# Patient Record
Sex: FEMALE | Race: WHITE | Employment: UNEMPLOYED | URBAN - METROPOLITAN AREA
[De-identification: names, ages, dates, MRNs, and addresses within clinical notes are randomized per-mention and may not be internally consistent; named-entity substitution may affect disease eponyms.]

---

## 2024-01-21 ENCOUNTER — HOSPITAL ENCOUNTER (INPATIENT)
Age: 84
LOS: 4 days | Discharge: NURSING, REHAB OR LONG-TERM CARE | End: 2024-01-26
Attending: STUDENT IN AN ORGANIZED HEALTH CARE EDUCATION/TRAINING PROGRAM | Admitting: INTERNAL MEDICINE
Payer: MEDICARE

## 2024-01-21 ENCOUNTER — APPOINTMENT (OUTPATIENT)
Dept: GENERAL RADIOLOGY | Age: 84
End: 2024-01-21
Payer: MEDICARE

## 2024-01-21 DIAGNOSIS — U07.1 COVID-19: ICD-10-CM

## 2024-01-21 DIAGNOSIS — J81.0 ACUTE PULMONARY EDEMA (HCC): ICD-10-CM

## 2024-01-21 DIAGNOSIS — J96.01 ACUTE RESPIRATORY FAILURE WITH HYPOXIA (HCC): Primary | ICD-10-CM

## 2024-01-21 DIAGNOSIS — J18.9 PNEUMONIA OF BOTH LUNGS DUE TO INFECTIOUS ORGANISM, UNSPECIFIED PART OF LUNG: ICD-10-CM

## 2024-01-21 LAB
ALBUMIN SERPL-MCNC: 2.7 G/DL (ref 3.4–5)
ALBUMIN/GLOB SERPL: 0.7 {RATIO} (ref 1.1–2.2)
ALP SERPL-CCNC: 92 U/L (ref 40–129)
ALT SERPL-CCNC: 17 U/L (ref 10–40)
ANION GAP SERPL CALCULATED.3IONS-SCNC: 8 MMOL/L (ref 3–16)
AST SERPL-CCNC: 28 U/L (ref 15–37)
BASE EXCESS BLDV CALC-SCNC: -5.1 MMOL/L (ref -3–3)
BASOPHILS # BLD: 0.1 K/UL (ref 0–0.2)
BASOPHILS NFR BLD: 0.7 %
BILIRUB SERPL-MCNC: 0.5 MG/DL (ref 0–1)
BUN SERPL-MCNC: 28 MG/DL (ref 7–20)
CALCIUM SERPL-MCNC: 8.9 MG/DL (ref 8.3–10.6)
CHLORIDE SERPL-SCNC: 107 MMOL/L (ref 99–110)
CO2 BLDV-SCNC: 42 MMOL/L
CO2 SERPL-SCNC: 17 MMOL/L (ref 21–32)
COHGB MFR BLDV: 5.6 % (ref 0–1.5)
CREAT SERPL-MCNC: 0.9 MG/DL (ref 0.6–1.2)
DEPRECATED RDW RBC AUTO: 16.4 % (ref 12.4–15.4)
EOSINOPHIL # BLD: 0 K/UL (ref 0–0.6)
EOSINOPHIL NFR BLD: 0.2 %
FLUAV RNA RESP QL NAA+PROBE: NOT DETECTED
FLUBV RNA RESP QL NAA+PROBE: NOT DETECTED
GFR SERPLBLD CREATININE-BSD FMLA CKD-EPI: >60 ML/MIN/{1.73_M2}
GLUCOSE SERPL-MCNC: 123 MG/DL (ref 70–99)
HCO3 BLDV-SCNC: 18.1 MMOL/L (ref 23–29)
HCT VFR BLD AUTO: 34.2 % (ref 36–48)
HGB BLD-MCNC: 11.3 G/DL (ref 12–16)
LACTATE BLDV-SCNC: 1.1 MMOL/L (ref 0.4–1.9)
LYMPHOCYTES # BLD: 0.7 K/UL (ref 1–5.1)
LYMPHOCYTES NFR BLD: 8.5 %
MCH RBC QN AUTO: 30.1 PG (ref 26–34)
MCHC RBC AUTO-ENTMCNC: 32.9 G/DL (ref 31–36)
MCV RBC AUTO: 91.4 FL (ref 80–100)
METHGB MFR BLDV: 1.2 %
MONOCYTES # BLD: 1.2 K/UL (ref 0–1.3)
MONOCYTES NFR BLD: 15.5 %
NEUTROPHILS # BLD: 5.8 K/UL (ref 1.7–7.7)
NEUTROPHILS NFR BLD: 75.1 %
NT-PROBNP SERPL-MCNC: 9209 PG/ML (ref 0–449)
O2 CT VFR BLDV CALC: 15 VOL %
O2 THERAPY: ABNORMAL
PCO2 BLDV: 27.4 MMHG (ref 40–50)
PH BLDV: 7.43 [PH] (ref 7.35–7.45)
PLATELET # BLD AUTO: 291 K/UL (ref 135–450)
PMV BLD AUTO: 8.1 FL (ref 5–10.5)
PO2 BLDV: 133 MMHG (ref 25–40)
POTASSIUM SERPL-SCNC: 5.4 MMOL/L (ref 3.5–5.1)
PROCALCITONIN SERPL IA-MCNC: 0.1 NG/ML (ref 0–0.15)
PROT SERPL-MCNC: 6.5 G/DL (ref 6.4–8.2)
RBC # BLD AUTO: 3.74 M/UL (ref 4–5.2)
RSV AG NOSE QL: NEGATIVE
SAO2 % BLDV: 100 %
SARS-COV-2 RNA RESP QL NAA+PROBE: DETECTED
SODIUM SERPL-SCNC: 132 MMOL/L (ref 136–145)
TROPONIN, HIGH SENSITIVITY: 57 NG/L (ref 0–14)
WBC # BLD AUTO: 7.7 K/UL (ref 4–11)

## 2024-01-21 PROCEDURE — 80053 COMPREHEN METABOLIC PANEL: CPT

## 2024-01-21 PROCEDURE — 84145 PROCALCITONIN (PCT): CPT

## 2024-01-21 PROCEDURE — 83605 ASSAY OF LACTIC ACID: CPT

## 2024-01-21 PROCEDURE — 99285 EMERGENCY DEPT VISIT HI MDM: CPT

## 2024-01-21 PROCEDURE — 71045 X-RAY EXAM CHEST 1 VIEW: CPT

## 2024-01-21 PROCEDURE — 83880 ASSAY OF NATRIURETIC PEPTIDE: CPT

## 2024-01-21 PROCEDURE — 84484 ASSAY OF TROPONIN QUANT: CPT

## 2024-01-21 PROCEDURE — 87040 BLOOD CULTURE FOR BACTERIA: CPT

## 2024-01-21 PROCEDURE — 87636 SARSCOV2 & INF A&B AMP PRB: CPT

## 2024-01-21 PROCEDURE — 82803 BLOOD GASES ANY COMBINATION: CPT

## 2024-01-21 PROCEDURE — 93005 ELECTROCARDIOGRAM TRACING: CPT | Performed by: STUDENT IN AN ORGANIZED HEALTH CARE EDUCATION/TRAINING PROGRAM

## 2024-01-21 PROCEDURE — 87807 RSV ASSAY W/OPTIC: CPT

## 2024-01-21 PROCEDURE — 85025 COMPLETE CBC W/AUTO DIFF WBC: CPT

## 2024-01-21 RX ORDER — GUAIFENESIN 600 MG/1
1200 TABLET, EXTENDED RELEASE ORAL 2 TIMES DAILY
COMMUNITY

## 2024-01-21 RX ORDER — LOPERAMIDE HYDROCHLORIDE 2 MG/1
2 CAPSULE ORAL 4 TIMES DAILY PRN
Status: ON HOLD | COMMUNITY
End: 2024-01-26 | Stop reason: HOSPADM

## 2024-01-21 RX ORDER — NITROFURANTOIN MACROCRYSTALS 100 MG/1
100 CAPSULE ORAL 4 TIMES DAILY
Status: ON HOLD | COMMUNITY
End: 2024-01-26 | Stop reason: HOSPADM

## 2024-01-21 RX ORDER — ATORVASTATIN CALCIUM 40 MG/1
40 TABLET, FILM COATED ORAL DAILY
COMMUNITY

## 2024-01-21 RX ORDER — CITALOPRAM 40 MG/1
40 TABLET ORAL DAILY
COMMUNITY

## 2024-01-21 RX ORDER — POTASSIUM CHLORIDE 1.5 G/1.58G
20 POWDER, FOR SOLUTION ORAL 2 TIMES DAILY
Status: ON HOLD | COMMUNITY
End: 2024-01-26 | Stop reason: HOSPADM

## 2024-01-21 RX ORDER — OMEPRAZOLE 20 MG/1
20 CAPSULE, DELAYED RELEASE ORAL DAILY
COMMUNITY

## 2024-01-21 RX ORDER — ALBUTEROL SULFATE 0.63 MG/3ML
1 SOLUTION RESPIRATORY (INHALATION) EVERY 6 HOURS PRN
COMMUNITY

## 2024-01-21 RX ORDER — CLOPIDOGREL BISULFATE 75 MG/1
75 TABLET ORAL DAILY
COMMUNITY

## 2024-01-21 RX ORDER — ASPIRIN 81 MG/1
81 TABLET, CHEWABLE ORAL DAILY
COMMUNITY

## 2024-01-21 RX ORDER — CARVEDILOL 12.5 MG/1
12.5 TABLET ORAL 2 TIMES DAILY WITH MEALS
COMMUNITY

## 2024-01-21 RX ORDER — ALPRAZOLAM 0.25 MG/1
0.25 TABLET ORAL NIGHTLY PRN
COMMUNITY

## 2024-01-21 RX ORDER — ACETAMINOPHEN 500 MG
500 TABLET ORAL EVERY 6 HOURS PRN
Status: ON HOLD | COMMUNITY
End: 2024-01-26 | Stop reason: HOSPADM

## 2024-01-21 ASSESSMENT — PAIN - FUNCTIONAL ASSESSMENT: PAIN_FUNCTIONAL_ASSESSMENT: NONE - DENIES PAIN

## 2024-01-21 ASSESSMENT — LIFESTYLE VARIABLES: HOW OFTEN DO YOU HAVE A DRINK CONTAINING ALCOHOL: NEVER

## 2024-01-22 PROBLEM — J18.9 COMMUNITY ACQUIRED PNEUMONIA, BILATERAL: Status: ACTIVE | Noted: 2024-01-22

## 2024-01-22 LAB
ANION GAP SERPL CALCULATED.3IONS-SCNC: 11 MMOL/L (ref 3–16)
BUN SERPL-MCNC: 33 MG/DL (ref 7–20)
CALCIUM SERPL-MCNC: 9 MG/DL (ref 8.3–10.6)
CHLORIDE SERPL-SCNC: 109 MMOL/L (ref 99–110)
CO2 SERPL-SCNC: 19 MMOL/L (ref 21–32)
CREAT SERPL-MCNC: 1 MG/DL (ref 0.6–1.2)
CRP SERPL-MCNC: 193.7 MG/L (ref 0–5.1)
EKG ATRIAL RATE: 83 BPM
EKG DIAGNOSIS: NORMAL
EKG Q-T INTERVAL: 326 MS
EKG QRS DURATION: 84 MS
EKG QTC CALCULATION (BAZETT): 394 MS
EKG R AXIS: 61 DEGREES
EKG T AXIS: 185 DEGREES
EKG VENTRICULAR RATE: 88 BPM
FERRITIN SERPL IA-MCNC: 752.5 NG/ML (ref 15–150)
FOLATE SERPL-MCNC: <2 NG/ML (ref 4.78–24.2)
GFR SERPLBLD CREATININE-BSD FMLA CKD-EPI: 56 ML/MIN/{1.73_M2}
GLUCOSE SERPL-MCNC: 119 MG/DL (ref 70–99)
IRON SATN MFR SERPL: 11 % (ref 15–50)
IRON SERPL-MCNC: 15 UG/DL (ref 37–145)
POTASSIUM SERPL-SCNC: 4.3 MMOL/L (ref 3.5–5.1)
SODIUM SERPL-SCNC: 139 MMOL/L (ref 136–145)
TIBC SERPL-MCNC: 134 UG/DL (ref 260–445)
TROPONIN, HIGH SENSITIVITY: 55 NG/L (ref 0–14)
VIT B12 SERPL-MCNC: 563 PG/ML (ref 211–911)

## 2024-01-22 PROCEDURE — 6370000000 HC RX 637 (ALT 250 FOR IP): Performed by: INTERNAL MEDICINE

## 2024-01-22 PROCEDURE — 6370000000 HC RX 637 (ALT 250 FOR IP): Performed by: NURSE PRACTITIONER

## 2024-01-22 PROCEDURE — 97166 OT EVAL MOD COMPLEX 45 MIN: CPT

## 2024-01-22 PROCEDURE — 82607 VITAMIN B-12: CPT

## 2024-01-22 PROCEDURE — 6360000002 HC RX W HCPCS: Performed by: NURSE PRACTITIONER

## 2024-01-22 PROCEDURE — 2580000003 HC RX 258: Performed by: NURSE PRACTITIONER

## 2024-01-22 PROCEDURE — 82728 ASSAY OF FERRITIN: CPT

## 2024-01-22 PROCEDURE — 94640 AIRWAY INHALATION TREATMENT: CPT

## 2024-01-22 PROCEDURE — 82746 ASSAY OF FOLIC ACID SERUM: CPT

## 2024-01-22 PROCEDURE — 97530 THERAPEUTIC ACTIVITIES: CPT

## 2024-01-22 PROCEDURE — 83540 ASSAY OF IRON: CPT

## 2024-01-22 PROCEDURE — 97535 SELF CARE MNGMENT TRAINING: CPT

## 2024-01-22 PROCEDURE — C8929 TTE W OR WO FOL WCON,DOPPLER: HCPCS

## 2024-01-22 PROCEDURE — 6360000004 HC RX CONTRAST MEDICATION: Performed by: INTERNAL MEDICINE

## 2024-01-22 PROCEDURE — 86140 C-REACTIVE PROTEIN: CPT

## 2024-01-22 PROCEDURE — 93010 ELECTROCARDIOGRAM REPORT: CPT | Performed by: INTERNAL MEDICINE

## 2024-01-22 PROCEDURE — 36415 COLL VENOUS BLD VENIPUNCTURE: CPT

## 2024-01-22 PROCEDURE — 94761 N-INVAS EAR/PLS OXIMETRY MLT: CPT

## 2024-01-22 PROCEDURE — 1200000000 HC SEMI PRIVATE

## 2024-01-22 PROCEDURE — 84484 ASSAY OF TROPONIN QUANT: CPT

## 2024-01-22 PROCEDURE — 80048 BASIC METABOLIC PNL TOTAL CA: CPT

## 2024-01-22 PROCEDURE — 97162 PT EVAL MOD COMPLEX 30 MIN: CPT

## 2024-01-22 PROCEDURE — 83550 IRON BINDING TEST: CPT

## 2024-01-22 PROCEDURE — 2700000000 HC OXYGEN THERAPY PER DAY

## 2024-01-22 PROCEDURE — 6360000002 HC RX W HCPCS: Performed by: INTERNAL MEDICINE

## 2024-01-22 PROCEDURE — 87449 NOS EACH ORGANISM AG IA: CPT

## 2024-01-22 RX ORDER — GUAIFENESIN 600 MG/1
600 TABLET, EXTENDED RELEASE ORAL 2 TIMES DAILY
Status: DISCONTINUED | OUTPATIENT
Start: 2024-01-22 | End: 2024-01-26 | Stop reason: HOSPADM

## 2024-01-22 RX ORDER — SODIUM CHLORIDE 0.9 % (FLUSH) 0.9 %
5-40 SYRINGE (ML) INJECTION PRN
Status: DISCONTINUED | OUTPATIENT
Start: 2024-01-22 | End: 2024-01-26 | Stop reason: HOSPADM

## 2024-01-22 RX ORDER — SODIUM CHLORIDE 0.9 % (FLUSH) 0.9 %
5-40 SYRINGE (ML) INJECTION EVERY 12 HOURS SCHEDULED
Status: DISCONTINUED | OUTPATIENT
Start: 2024-01-22 | End: 2024-01-26 | Stop reason: HOSPADM

## 2024-01-22 RX ORDER — MAGNESIUM SULFATE IN WATER 40 MG/ML
2000 INJECTION, SOLUTION INTRAVENOUS PRN
Status: DISCONTINUED | OUTPATIENT
Start: 2024-01-22 | End: 2024-01-26 | Stop reason: HOSPADM

## 2024-01-22 RX ORDER — ENOXAPARIN SODIUM 100 MG/ML
40 INJECTION SUBCUTANEOUS DAILY
Status: DISCONTINUED | OUTPATIENT
Start: 2024-01-22 | End: 2024-01-24

## 2024-01-22 RX ORDER — FUROSEMIDE 10 MG/ML
40 INJECTION INTRAMUSCULAR; INTRAVENOUS ONCE
Status: DISCONTINUED | OUTPATIENT
Start: 2024-01-22 | End: 2024-01-22

## 2024-01-22 RX ORDER — POLYETHYLENE GLYCOL 3350 17 G/17G
17 POWDER, FOR SOLUTION ORAL DAILY PRN
Status: DISCONTINUED | OUTPATIENT
Start: 2024-01-22 | End: 2024-01-26 | Stop reason: HOSPADM

## 2024-01-22 RX ORDER — FOLIC ACID 1 MG/1
1 TABLET ORAL DAILY
Status: DISCONTINUED | OUTPATIENT
Start: 2024-01-22 | End: 2024-01-26 | Stop reason: HOSPADM

## 2024-01-22 RX ORDER — FUROSEMIDE 10 MG/ML
20 INJECTION INTRAMUSCULAR; INTRAVENOUS DAILY
Status: DISCONTINUED | OUTPATIENT
Start: 2024-01-22 | End: 2024-01-23

## 2024-01-22 RX ORDER — PANTOPRAZOLE SODIUM 40 MG/1
40 TABLET, DELAYED RELEASE ORAL
Status: DISCONTINUED | OUTPATIENT
Start: 2024-01-22 | End: 2024-01-26 | Stop reason: HOSPADM

## 2024-01-22 RX ORDER — ATORVASTATIN CALCIUM 40 MG/1
40 TABLET, FILM COATED ORAL DAILY
Status: DISCONTINUED | OUTPATIENT
Start: 2024-01-22 | End: 2024-01-26 | Stop reason: HOSPADM

## 2024-01-22 RX ORDER — ONDANSETRON 2 MG/ML
4 INJECTION INTRAMUSCULAR; INTRAVENOUS EVERY 6 HOURS PRN
Status: DISCONTINUED | OUTPATIENT
Start: 2024-01-22 | End: 2024-01-26 | Stop reason: HOSPADM

## 2024-01-22 RX ORDER — ALPRAZOLAM 0.25 MG/1
0.25 TABLET ORAL NIGHTLY PRN
Status: DISCONTINUED | OUTPATIENT
Start: 2024-01-22 | End: 2024-01-26 | Stop reason: HOSPADM

## 2024-01-22 RX ORDER — ONDANSETRON 4 MG/1
4 TABLET, ORALLY DISINTEGRATING ORAL EVERY 8 HOURS PRN
Status: DISCONTINUED | OUTPATIENT
Start: 2024-01-22 | End: 2024-01-26 | Stop reason: HOSPADM

## 2024-01-22 RX ORDER — IPRATROPIUM BROMIDE AND ALBUTEROL SULFATE 2.5; .5 MG/3ML; MG/3ML
1 SOLUTION RESPIRATORY (INHALATION)
Status: DISCONTINUED | OUTPATIENT
Start: 2024-01-22 | End: 2024-01-25

## 2024-01-22 RX ORDER — ASPIRIN 81 MG/1
324 TABLET, CHEWABLE ORAL ONCE
Status: COMPLETED | OUTPATIENT
Start: 2024-01-22 | End: 2024-01-22

## 2024-01-22 RX ORDER — POTASSIUM CHLORIDE 7.45 MG/ML
10 INJECTION INTRAVENOUS PRN
Status: DISCONTINUED | OUTPATIENT
Start: 2024-01-22 | End: 2024-01-26 | Stop reason: HOSPADM

## 2024-01-22 RX ORDER — DEXAMETHASONE SODIUM PHOSPHATE 10 MG/ML
6 INJECTION, SOLUTION INTRAMUSCULAR; INTRAVENOUS EVERY 24 HOURS
Status: DISCONTINUED | OUTPATIENT
Start: 2024-01-22 | End: 2024-01-26

## 2024-01-22 RX ORDER — ACETAMINOPHEN 650 MG/1
650 SUPPOSITORY RECTAL EVERY 6 HOURS PRN
Status: DISCONTINUED | OUTPATIENT
Start: 2024-01-22 | End: 2024-01-26 | Stop reason: HOSPADM

## 2024-01-22 RX ORDER — ACETAMINOPHEN 325 MG/1
650 TABLET ORAL EVERY 6 HOURS PRN
Status: DISCONTINUED | OUTPATIENT
Start: 2024-01-22 | End: 2024-01-26 | Stop reason: HOSPADM

## 2024-01-22 RX ORDER — AZITHROMYCIN 500 MG/1
500 INJECTION, POWDER, LYOPHILIZED, FOR SOLUTION INTRAVENOUS ONCE
Status: DISCONTINUED | OUTPATIENT
Start: 2024-01-22 | End: 2024-01-22 | Stop reason: SDUPTHER

## 2024-01-22 RX ORDER — CARVEDILOL 6.25 MG/1
12.5 TABLET ORAL 2 TIMES DAILY WITH MEALS
Status: DISCONTINUED | OUTPATIENT
Start: 2024-01-22 | End: 2024-01-26 | Stop reason: HOSPADM

## 2024-01-22 RX ORDER — CLOPIDOGREL BISULFATE 75 MG/1
75 TABLET ORAL DAILY
Status: DISCONTINUED | OUTPATIENT
Start: 2024-01-22 | End: 2024-01-26 | Stop reason: HOSPADM

## 2024-01-22 RX ORDER — CITALOPRAM 20 MG/1
40 TABLET ORAL DAILY
Status: DISCONTINUED | OUTPATIENT
Start: 2024-01-22 | End: 2024-01-26 | Stop reason: HOSPADM

## 2024-01-22 RX ORDER — DEXAMETHASONE SODIUM PHOSPHATE 10 MG/ML
6 INJECTION INTRAMUSCULAR; INTRAVENOUS ONCE
Status: COMPLETED | OUTPATIENT
Start: 2024-01-22 | End: 2024-01-22

## 2024-01-22 RX ORDER — POTASSIUM CHLORIDE 20 MEQ/1
40 TABLET, EXTENDED RELEASE ORAL PRN
Status: DISCONTINUED | OUTPATIENT
Start: 2024-01-22 | End: 2024-01-26 | Stop reason: HOSPADM

## 2024-01-22 RX ORDER — ASPIRIN 81 MG/1
81 TABLET, CHEWABLE ORAL DAILY
Status: DISCONTINUED | OUTPATIENT
Start: 2024-01-22 | End: 2024-01-26 | Stop reason: HOSPADM

## 2024-01-22 RX ORDER — SODIUM CHLORIDE 9 MG/ML
INJECTION, SOLUTION INTRAVENOUS PRN
Status: DISCONTINUED | OUTPATIENT
Start: 2024-01-22 | End: 2024-01-26 | Stop reason: HOSPADM

## 2024-01-22 RX ADMIN — GUAIFENESIN 600 MG: 600 TABLET, EXTENDED RELEASE ORAL at 22:13

## 2024-01-22 RX ADMIN — ASPIRIN 81 MG 81 MG: 81 TABLET ORAL at 10:00

## 2024-01-22 RX ADMIN — IPRATROPIUM BROMIDE AND ALBUTEROL SULFATE 1 DOSE: 2.5; .5 SOLUTION RESPIRATORY (INHALATION) at 07:54

## 2024-01-22 RX ADMIN — CEFTRIAXONE SODIUM 1000 MG: 1 INJECTION, POWDER, FOR SOLUTION INTRAMUSCULAR; INTRAVENOUS at 00:56

## 2024-01-22 RX ADMIN — PERFLUTREN 1.5 ML: 6.52 INJECTION, SUSPENSION INTRAVENOUS at 16:24

## 2024-01-22 RX ADMIN — GUAIFENESIN 600 MG: 600 TABLET, EXTENDED RELEASE ORAL at 09:59

## 2024-01-22 RX ADMIN — SODIUM CHLORIDE, PRESERVATIVE FREE 10 ML: 5 INJECTION INTRAVENOUS at 22:14

## 2024-01-22 RX ADMIN — CARVEDILOL 12.5 MG: 6.25 TABLET, FILM COATED ORAL at 17:54

## 2024-01-22 RX ADMIN — DEXAMETHASONE SODIUM PHOSPHATE 6 MG: 10 INJECTION INTRAMUSCULAR; INTRAVENOUS at 00:46

## 2024-01-22 RX ADMIN — CLOPIDOGREL BISULFATE 75 MG: 75 TABLET ORAL at 09:59

## 2024-01-22 RX ADMIN — DEXAMETHASONE SODIUM PHOSPHATE 6 MG: 10 INJECTION, SOLUTION INTRAMUSCULAR; INTRAVENOUS at 22:13

## 2024-01-22 RX ADMIN — SODIUM CHLORIDE, PRESERVATIVE FREE 10 ML: 5 INJECTION INTRAVENOUS at 09:58

## 2024-01-22 RX ADMIN — ASPIRIN 81 MG 324 MG: 81 TABLET ORAL at 00:47

## 2024-01-22 RX ADMIN — ENOXAPARIN SODIUM 40 MG: 100 INJECTION SUBCUTANEOUS at 09:59

## 2024-01-22 RX ADMIN — CARVEDILOL 12.5 MG: 6.25 TABLET, FILM COATED ORAL at 09:59

## 2024-01-22 RX ADMIN — AZITHROMYCIN MONOHYDRATE 500 MG: 500 INJECTION, POWDER, LYOPHILIZED, FOR SOLUTION INTRAVENOUS at 01:32

## 2024-01-22 RX ADMIN — FOLIC ACID 1 MG: 1 TABLET ORAL at 17:54

## 2024-01-22 RX ADMIN — CITALOPRAM HYDROBROMIDE 40 MG: 20 TABLET ORAL at 09:59

## 2024-01-22 RX ADMIN — FUROSEMIDE 20 MG: 10 INJECTION, SOLUTION INTRAMUSCULAR; INTRAVENOUS at 10:00

## 2024-01-22 RX ADMIN — IPRATROPIUM BROMIDE AND ALBUTEROL SULFATE 1 DOSE: 2.5; .5 SOLUTION RESPIRATORY (INHALATION) at 12:58

## 2024-01-22 RX ADMIN — ATORVASTATIN CALCIUM 40 MG: 40 TABLET, FILM COATED ORAL at 09:59

## 2024-01-22 NOTE — ED NOTES
ED TO INPATIENT SBAR HANDOFF    Patient Name: Jazmín Vazquez   :  1940  83 y.o.   MRN:  [id number]  Preferred Name  Jazmín   ED Room #:  ED-0008/08  Family/Caregiver Present no   Restraints no   Sitter no   Sepsis Risk Score Sepsis Risk Score: 1.17    Situation  Code Status: No Order No additional code details.    Allergies: Patient has no known allergies.  Weight: Patient Vitals for the past 96 hrs (Last 3 readings):   Weight   24 2225 69.9 kg (154 lb)     Arrived from: nursing home  Chief Complaint:   Chief Complaint   Patient presents with    Shortness of Breath     Pt arrived to ED via first care from [facility] with c/o O2 saturation in the 80s, SOB, a cough, and fever. Per ems, the fever has been off and on all day, cough for two weeks, and SOB. O2 saturation 90 on RA.      Hospital Problem/Diagnosis:  Principal Problem:    Community acquired pneumonia, bilateral  Resolved Problems:    * No resolved hospital problems. *    Imaging:   XR CHEST PORTABLE   Final Result   Bilateral patchy pneumonia.           Abnormal labs:   Abnormal Labs Reviewed   COVID-19 & INFLUENZA COMBO - Abnormal; Notable for the following components:       Result Value    SARS-CoV-2 RNA, RT PCR DETECTED (*)     All other components within normal limits   CBC WITH AUTO DIFFERENTIAL - Abnormal; Notable for the following components:    RBC 3.74 (*)     Hemoglobin 11.3 (*)     Hematocrit 34.2 (*)     RDW 16.4 (*)     Lymphocytes Absolute 0.7 (*)     All other components within normal limits   COMPREHENSIVE METABOLIC PANEL - Abnormal; Notable for the following components:    Sodium 132 (*)     Potassium 5.4 (*)     CO2 17 (*)     Glucose 123 (*)     BUN 28 (*)     Albumin 2.7 (*)     Albumin/Globulin Ratio 0.7 (*)     All other components within normal limits   TROPONIN - Abnormal; Notable for the following components:    Troponin, High Sensitivity 57 (*)     All other components within normal limits   BRAIN NATRIURETIC PEPTIDE  - Abnormal; Notable for the following components:    Pro-BNP 9,209 (*)     All other components within normal limits   BLOOD GAS, VENOUS - Abnormal; Notable for the following components:    pCO2, Vinh 27.4 (*)     pO2, Vinh 133.0 (*)     HCO3, Venous 18.1 (*)     Base Excess, Vinh -5.1 (*)     Carboxyhemoglobin 5.6 (*)     All other components within normal limits     Critical values: no     Abnormal Assessment Findings: na    Background  History:   Past Medical History:   Diagnosis Date    Anxiety     Atherosclerotic heart disease of native coronary artery without angina pectoris     Cardiac pacemaker     Cervicalgia     Chronic kidney disease, stage 3 (HCC)     Chronic systolic (congestive) heart failure (HCC)     Depression     Essential hypertension     Failure to thrive in adult     GERD (gastroesophageal reflux disease)     Hyperlipidemia     Ischemic cardiomyopathy     Other dysphagia     Paroxysmal A-fib (HCC)     Primary generalized hypertrophic osteoarthrosis     Pulmonary hypertension (HCC)     Rhabdomyolysis     Sick sinus syndrome (HCC)     Sleep apnea     Spondylosis with myelopathy, lumbar region     Tremor, unspecified        Assessment    Vitals/MEWS: MEWS Score: 1  Level of Consciousness: Alert (0)   Vitals:    01/21/24 2225 01/22/24 0046 01/22/24 0100   BP: (!) 124/57 (!) 109/49    Pulse: 83 84 82   Resp: 17 18 18   Temp: 98 °F (36.7 °C)     TempSrc: Oral     SpO2: (!) 89% 91% 95%   Weight: 69.9 kg (154 lb)     Height: 1.6 m (5' 3\")       FiO2 (%): nasal cannula for respiratory distress  O2 Flow Rate: O2 Device: None (Room air) O2 Flow Rate (L/min): 2 L/min  Cardiac Rhythm:    Pain Assessment:  [x] Verbal [] Caruso Hoffman Scale  Pain Scale: Pain Assessment  Pain Assessment: None - Denies Pain  Last documented pain score (0-10 scale)    Last documented pain medication administered: see MAR  Mental Status: oriented, alert, coherent, logical, thought processes intact, and able to concentrate and follow

## 2024-01-22 NOTE — ED NOTES
Scattered bruising throughout pt whole body. Chaffed area noted to pt left groin. Mepalex border placed to pt sacrum for extra protection.

## 2024-01-22 NOTE — PROGRESS NOTES
Patient seen and examined by one of my partners earlier today.    I agree with their assessment and plan.   Patient also seen and examined by me today.  Chart reviewed.    Necessary orders placed.  Follow up ECHO, CRP.  Procalcitonin low; hold off ABx.  Will continue to follow while in the hospital.        Marcella Coffey MD

## 2024-01-22 NOTE — CARE COORDINATION
Discharge Planning.     The SW called and left a message with Dari the admission coordinator at Home at [facility] to confirm the pt is LTC or  SNF. The SW at this time is waiting on a return call.     Electronically signed by RENETTA Rousseau on 1/22/2024 at 11:28 AM

## 2024-01-22 NOTE — PROGRESS NOTES
[facility] - Inpatient Rehabilitation Department   Phone: [phone]    Occupational Therapy    [x] Initial Evaluation            [] Daily Treatment Note         [] Discharge Summary      Patient: Jazmín Vazquez   : 1940   MRN: [id number]   Date of Service:  2024    Admitting Diagnosis:  Community acquired pneumonia, bilateral  Current Admission Summary: Per EMR \"Jazmín Vazquez is a 83 y.o. female with pmh of CHF, A-fib, HLD, GERD who presents with cough and shortness of breath.  Patient from [facility] who has been experiencing 2 weeks of cough and shortness of breath.  Patient's voice is hoarse and difficult to understand but states that she has not felt well for 2 weeks and they have been ignoring her at [facility].  She reports cough but difficulty coughing up sputum.  Fevers off and on at [facility] per EMS report.   She denies any N/V.  She denies any pain.\"  Past Medical History:  has a past medical history of Anxiety, Atherosclerotic heart disease of native coronary artery without angina pectoris, Cardiac pacemaker, Cervicalgia, Chronic kidney disease, stage 3 (HCC), Chronic systolic (congestive) heart failure (HCC), Depression, Essential hypertension, Failure to thrive in adult, GERD (gastroesophageal reflux disease), Hyperlipidemia, Ischemic cardiomyopathy, Other dysphagia, Paroxysmal A-fib (HCC), Primary generalized hypertrophic osteoarthrosis, Pulmonary hypertension (HCC), Rhabdomyolysis, Sick sinus syndrome (HCC), Sleep apnea, Spondylosis with myelopathy, lumbar region, and Tremor, unspecified.  Past Surgical History:  has no past surgical history on file.    Discharge Recommendations: Jazmín Vazquez scored a 10/ on the AM-PAC ADL Inpatient form. Current research shows that an AM-PAC score of 17 or less is typically not associated with a discharge to the patient's home setting. Based on the patient's AM-PAC score and their current ADL deficits, it is recommended that the patient have 3-5 sessions  stimuli  Following Commands: follows one step commands with increased time  Memory: decreased recall of recent events, decreased short term memory, decreased long term memory  Insights: decreased awareness of deficits  Initiation: requires cues for some  Orientation:    oriented to person, oriented to time, oriented to situation, and disoriented to place  Command Following:   accurately follows one step commands     Education  Barriers To Learning: cognition  Patient Education: patient educated on goals, OT role and benefits, plan of care, proper use of assistive device/equipment, energy conservation, orientation, transfer training, discharge recommendations  Learning Assessment:  patient verbalizes understanding, would benefit from continued reinforcement    Assessment  Activity Tolerance: Pt tolerated fair. Pt noted with increased fatigue as session progressed. Pt's vitals monitored t/o session. Pt on 2L/min supplemental O2 throughout session.    While supine in bed:   - O2 of 93%    While EOB:    - BP of 129/68   - HR of 76  - O2 of 97%    While seated in recliner:   - O2 of 99%      Impairments Requiring Therapeutic Intervention: decreased functional mobility, decreased ADL status, decreased ROM, decreased strength, decreased safety awareness, decreased cognition, decreased endurance, decreased balance  Prognosis: fair  Clinical Assessment: The patient is a 83 y.o. female who presents below their baseline level of function due to above deficits after dx of PNA and COVID. Pt noted with decreased volume of voice this date and poor recall of prior events. Pt would benefit from continued skilled OT to increase strength, balance, endurance, fxnl mobility, safety, and independence to promote safe discharge and return to PLOF. Continue POC.   Safety Interventions: patient left in chair, chair alarm in place, call light within reach, gait belt, patient at risk for falls, and nurse notified    Plan  Frequency: 3-5 x/per

## 2024-01-22 NOTE — THERAPY EVALUATION
[facility] - Inpatient Rehabilitation Department   Phone: [phone]    Physical Therapy    [x] Initial Evaluation            [] Daily Treatment Note         [] Discharge Summary      Patient: Jazmín Vazquez   : 1940   MRN: [id number]   Date of Service:  2024  Admitting Diagnosis: Community acquired pneumonia, bilateral  Current Admission Summary:   Per EMR \"Jazmín Vazquez is a 83 y.o. female with pmh of CHF, A-fib, HLD, GERD who presents with cough and shortness of breath.  Patient from [facility] who has been experiencing 2 weeks of cough and shortness of breath.  Patient's voice is hoarse and difficult to understand but states that she has not felt well for 2 weeks and they have been ignoring her at [facility].  She reports cough but difficulty coughing up sputum.  Fevers off and on at [facility] per EMS report.   She denies any N/V.  She denies any pain.\"  Being treated for COVID and PNA.    Past Medical History:  has a past medical history of Anxiety, Atherosclerotic heart disease of native coronary artery without angina pectoris, Cardiac pacemaker, Cervicalgia, Chronic kidney disease, stage 3 (HCC), Chronic systolic (congestive) heart failure (HCC), Depression, Essential hypertension, Failure to thrive in adult, GERD (gastroesophageal reflux disease), Hyperlipidemia, Ischemic cardiomyopathy, Other dysphagia, Paroxysmal A-fib (HCC), Primary generalized hypertrophic osteoarthrosis, Pulmonary hypertension (HCC), Rhabdomyolysis, Sick sinus syndrome (HCC), Sleep apnea, Spondylosis with myelopathy, lumbar region, and Tremor, unspecified.  Past Surgical History:  has no past surgical history on file.  Discharge Recommendations: Jazmín Vazquez scored a  on the AM-PAC short mobility form. Current research shows that an AM-PAC score of 17 or less is typically not associated with a discharge to the patient's home setting. Based on the patient's AM-PAC score and their current functional mobility deficits, it is

## 2024-01-22 NOTE — H&P
V2.0  History and Physical      Name:  Jazmín Vazquez /Age/Sex: 1940  (83 y.o. female)   MRN & CSN:  [id number] & [id number] Encounter Date/Time: 2024 1:38 AM EST   Location:  [facility] PCP: No primary care provider on file.       Hospital Day: 2    Assessment and Plan:   Jazmín Vazquez is a 83 y.o. female  who presents with Community acquired pneumonia, bilateral    Hospital Problems             Last Modified POA    * (Principal) Community acquired pneumonia, bilateral 2024 Yes       Plan:  Acute Respiratory Failure with Hypoxia secondary to bilateral pneumonia, COVID   Admit inpatient with telemetry  Patient has diffuse rhonchi with weak cough  Mucinex, duonebs  IV Decadron given hypoxia with COVID  IV antibiotics, bilateral opacities on CXR, fevers at ECF,   Blood cultures  Droplet plus isolation   Oxygen to keep sats >92%  PT, OT      2. Atrial Fibrillation  Continue BB, rate controlled  Previous Watchman, not on anticoagulation    3. Left Eye Conjunctivitis   Large amount of crusted drainage, with redness    4. Hyperlipidemia  Continue statin    5. Hx CHF  BNP elevated in ER with troponin, repeat troponin now, patient denies any CP  On exam she appears dry, will hold IV lasix for now and monitor       Disposition:   Current Living situation: ECF  Expected Disposition: ECF  Estimated D/C: 3    Diet No diet orders on file   DVT Prophylaxis [x] Lovenox, []  Heparin, [] SCDs, [] Ambulation,  [] Eliquis, [] Xarelto, [] Coumadin   Code Status No Order   Surrogate Decision Maker/ POA      Personally reviewed Lab Studies and Imaging     Discussed management of the case with Grecia Rees NP in ER who recommended admission       History from:     patient    History of Present Illness:     Chief Complaint: cough, shortness of breath  Jazmín Vazquez is a 83 y.o. female with pmh of CHF, A-fib, HLD, GERD who presents with cough and shortness of breath.  Patient from [facility] who has been experiencing 2 weeks

## 2024-01-22 NOTE — ED PROVIDER NOTES
other systems reviewed and are negative.      Positives and Pertinent negatives as per HPI.     SURGICAL HISTORY   History reviewed. No pertinent surgical history.    CURRENTMEDICATIONS       Previous Medications    ACETAMINOPHEN (TYLENOL) 500 MG TABLET    Take 1 tablet by mouth every 6 hours as needed for Pain    ALBUTEROL (ACCUNEB) 0.63 MG/3ML NEBULIZER SOLUTION    Take 3 mLs by nebulization every 6 hours as needed for Wheezing    ALPRAZOLAM (XANAX) 0.25 MG TABLET    Take 1 tablet by mouth nightly as needed for Sleep. Max Daily Amount: 0.25 mg    ASPIRIN 81 MG CHEWABLE TABLET    Take 1 tablet by mouth daily    ATORVASTATIN (LIPITOR) 40 MG TABLET    Take 1 tablet by mouth daily    CARVEDILOL (COREG) 12.5 MG TABLET    Take 1 tablet by mouth 2 times daily (with meals)    CITALOPRAM (CELEXA) 40 MG TABLET    Take 1 tablet by mouth daily    CLOPIDOGREL (PLAVIX) 75 MG TABLET    Take 1 tablet by mouth daily    GUAIFENESIN (MUCINEX) 600 MG EXTENDED RELEASE TABLET    Take 2 tablets by mouth 2 times daily    LOPERAMIDE (IMODIUM) 2 MG CAPSULE    Take 1 capsule by mouth 4 times daily as needed for Diarrhea    NITROFURANTOIN (MACRODANTIN) 100 MG CAPSULE    Take 1 capsule by mouth 4 times daily    OMEPRAZOLE (PRILOSEC) 20 MG DELAYED RELEASE CAPSULE    Take 1 capsule by mouth daily    POTASSIUM CHLORIDE (KLOR-CON) 20 MEQ PACKET    Take 20 mEq by mouth 2 times daily    PROBIOTIC PRODUCT PO    Take 1 tablet by mouth 2 times daily       ALLERGIES     Patient has no known allergies.    FAMILYHISTORY     History reviewed. No pertinent family history.     SOCIAL HISTORY       Social History     Tobacco Use    Smoking status: Never    Smokeless tobacco: Never   Substance Use Topics    Alcohol use: Never    Drug use: Never       SCREENINGS        [place] Coma Scale  Eye Opening: Spontaneous  Best Verbal Response: Oriented  Best Motor Response: Obeys commands  [place] Coma Scale Score: 15                CIWA Assessment  BP: (!) 109/49 (MD  Course, and Reassessment:     Briefly, this is an 83-year-old female who presents to the emergency department with complaint of shortness of breath with cough and fever, more so today but coughing over the past 2 weeks.  She is 90% on room air, requiring supplemental oxygen.  Patient is from [facility] point, full code.    She is in distress, does appear ill.     Procalcitonin 0.10.  CBC is unremarkable.  CMP unremarkable.  Troponin 57.  BNP 9209.  COVID-positive.  RSV negative.  Influenza negative.  Lactate normal.    XR CHEST PORTABLE (Final result)  Result time 01/21/24 23:48:23  Final result by Pinky Abdullahi MD (01/21/24 23:48:23)                Impression:    Bilateral patchy pneumonia.              Lasix, Decadron, antibiotics, and aspirin given in the emergency department.  Will admit for diuresis and respiratory support given the hypoxia.  Hospitalist consulted for admission of this patient.  Patient is full code.    Disposition Considerations (include 1 Tests not done, Shared Decision Making, Pt Expectation of Test or Tx.): Shared decision making: Initial differential diagnoses were discussed with this patient, along with physical exam findings and an explanation what evaluation studies were necessary and why. Labs and Imaging results were explained to the patient in detail, including explanation of what these results mean. All treatment and disposition options were discussed with the patient and a treatment plan with the patient's best short and long term care was made in collaboration with the patient.    I did consider ct chest    admit      I am the Primary Clinician of Record.    FINAL IMPRESSION      1. Acute respiratory failure with hypoxia (HCC)    2. COVID-19    3. Acute pulmonary edema (HCC)    4. Pneumonia of both lungs due to infectious organism, unspecified part of lung          DISPOSITION/PLAN     DISPOSITION Admitted 01/22/2024 12:45:04 AM      PATIENT REFERRED TO:  No follow-up provider

## 2024-01-22 NOTE — ED NOTES
Shortness of Breath (Pt arrived to ED via first care from [facility] with c/o O2 saturation in the 80s, SOB, a cough, and fever. Per ems, the fever has been off and on all day, cough for two weeks, and SOB. O2 saturation 90 on RA. )   Patient alert and oriented x4.  GCS 15/15.  Pt is pale.  No signs of acute distress noted at this time. Coarse expiratory Rhonchi in all lung fields.  denies pain.  0/10 pain.  HR 82     Patient placed on a continuous pulse oximetry and telemetry monitoring. Bedside Monitor on with Alarms audible and alarms set.  Patient on cycling blood pressure.     Patient oriented to room and ED throughput process.  Patient educated on all orders, including any medications.  Patient educated on chief complaint/symptoms. Patient encouraged to ask questions regarding care, medications or treatment plan.  Patient aware of how to reach staff with questions/concerns.  Safety measures and Fall risk precautions in place, ED bed locked in lowest position, bed side table within reach, bed alarm on, and call light in reach.  Plan of care ongoing.  Patient expresses no other needs at this time.

## 2024-01-22 NOTE — RT PROTOCOL NOTE
RT Nebulizer Bronchodilator Protocol Note    There is a bronchodilator order in the chart from a provider indicating to follow the RT Bronchodilator Protocol and there is an “Initiate RT Bronchodilator Protocol” order as well (see protocol at bottom of note).    CXR Findings:  XR CHEST PORTABLE    Result Date: 1/21/2024  Bilateral patchy pneumonia.       The findings from the last RT Protocol Assessment were as follows:  Smoking: Chronic pulmonary disease  Respiratory Pattern: Mild dyspnea at rest, irregular pattern, or RR 21-25 bpm  Breath Sounds: Intermittent or unilateral wheezes  Cough: Strong, spontaneous, non-productive  Indication for Bronchodilator Therapy:    Bronchodilator Assessment Score: 10    Aerosolized bronchodilator medication orders have been revised according to the RT Nebulizer Bronchodilator Protocol below.    Respiratory Therapist to perform RT Therapy Protocol Assessment initially then follow the protocol.  Repeat RT Therapy Protocol Assessment PRN for score 0-3 or on second treatment, BID, and PRN for scores above 3.    No Indications - adjust the frequency to every 6 hours PRN wheezing or bronchospasm, if no treatments needed after 48 hours then discontinue using Per Protocol order mode.     If indication present, adjust the RT bronchodilator orders based on the Bronchodilator Assessment Score as indicated below.  If a patient is on this medication at home then do not decrease Frequency below that used at home.    0-3 - enter or revise RT bronchodilator order(s) to equivalent RT Bronchodilator order with Frequency of every 4 hours PRN for wheezing or increased work of breathing using Per Protocol order mode.       4-6 - enter or revise RT Bronchodilator order(s) to two equivalent RT bronchodilator orders with one order with BID Frequency and one order with Frequency of every 4 hours PRN wheezing or increased work of breathing using Per Protocol order mode.         7-10 - enter or revise RT  Bronchodilator order(s) to two equivalent RT bronchodilator orders with one order with TID Frequency and one order with Frequency of every 4 hours PRN wheezing or increased work of breathing using Per Protocol order mode.       11-13 - enter or revise RT Bronchodilator order(s) to one equivalent RT bronchodilator order with QID Frequency and an Albuterol order with Frequency of every 4 hours PRN wheezing or increased work of breathing using Per Protocol order mode.      Greater than 13 - enter or revise RT Bronchodilator order(s) to one equivalent RT bronchodilator order with every 4 hours Frequency and an Albuterol order with Frequency of every 2 hours PRN wheezing or increased work of breathing using Per Protocol order mode.     RT to enter RT Home Evaluation for COPD & MDI Assessment order using Per Protocol order mode.    Electronically signed by Inocencio Curry RCP on 1/22/2024 at 3:28 PM

## 2024-01-22 NOTE — CARE COORDINATION
Discharge Planning.     The SW spoke with Dari the admission coordinator at Home at [facility] informed the SW that the patient is a SNF patient and will need a pre-cert to return.     The pt and the pt's niece are agreeable to returning back to Home at [facility] at discharge.      The SW requested Dari to start pre-cert on 1/22/2024      Electronically signed by RENETTA Rousseau on 1/22/2024 at 2:08 PM

## 2024-01-23 LAB
ACANTHOCYTES BLD QL SMEAR: ABNORMAL
ALBUMIN SERPL-MCNC: 2.6 G/DL (ref 3.4–5)
ALBUMIN/GLOB SERPL: 0.9 {RATIO} (ref 1.1–2.2)
ALP SERPL-CCNC: 95 U/L (ref 40–129)
ALT SERPL-CCNC: 19 U/L (ref 10–40)
ANION GAP SERPL CALCULATED.3IONS-SCNC: 13 MMOL/L (ref 3–16)
ANISOCYTOSIS BLD QL SMEAR: ABNORMAL
AST SERPL-CCNC: 25 U/L (ref 15–37)
BASOPHILS # BLD: 0 K/UL (ref 0–0.2)
BASOPHILS NFR BLD: 0 %
BILIRUB SERPL-MCNC: <0.2 MG/DL (ref 0–1)
BUN SERPL-MCNC: 40 MG/DL (ref 7–20)
CALCIUM SERPL-MCNC: 9 MG/DL (ref 8.3–10.6)
CHLORIDE SERPL-SCNC: 110 MMOL/L (ref 99–110)
CO2 SERPL-SCNC: 18 MMOL/L (ref 21–32)
CREAT SERPL-MCNC: 1.2 MG/DL (ref 0.6–1.2)
CRP SERPL-MCNC: 107.6 MG/L (ref 0–5.1)
DEPRECATED RDW RBC AUTO: 16.3 % (ref 12.4–15.4)
EOSINOPHIL # BLD: 0 K/UL (ref 0–0.6)
EOSINOPHIL NFR BLD: 0 %
GFR SERPLBLD CREATININE-BSD FMLA CKD-EPI: 45 ML/MIN/{1.73_M2}
GLUCOSE SERPL-MCNC: 104 MG/DL (ref 70–99)
HCT VFR BLD AUTO: 31.6 % (ref 36–48)
HGB BLD-MCNC: 10.3 G/DL (ref 12–16)
LEGIONELLA AG UR QL: NORMAL
LYMPHOCYTES # BLD: 0.4 K/UL (ref 1–5.1)
LYMPHOCYTES NFR BLD: 6 %
MAGNESIUM SERPL-MCNC: 1.7 MG/DL (ref 1.8–2.4)
MCH RBC QN AUTO: 29.8 PG (ref 26–34)
MCHC RBC AUTO-ENTMCNC: 32.7 G/DL (ref 31–36)
MCV RBC AUTO: 91 FL (ref 80–100)
METAMYELOCYTES NFR BLD MANUAL: 1 %
MONOCYTES # BLD: 0.4 K/UL (ref 0–1.3)
MONOCYTES NFR BLD: 5 %
MYELOCYTES NFR BLD MANUAL: 1 %
NEUTROPHILS # BLD: 6.6 K/UL (ref 1.7–7.7)
NEUTROPHILS NFR BLD: 79 %
NEUTS BAND NFR BLD MANUAL: 8 % (ref 0–7)
PHOSPHATE SERPL-MCNC: 3.1 MG/DL (ref 2.5–4.9)
PLATELET # BLD AUTO: 248 K/UL (ref 135–450)
PLATELET BLD QL SMEAR: ADEQUATE
PMV BLD AUTO: 8.2 FL (ref 5–10.5)
POTASSIUM SERPL-SCNC: 4.8 MMOL/L (ref 3.5–5.1)
PROT SERPL-MCNC: 5.6 G/DL (ref 6.4–8.2)
RBC # BLD AUTO: 3.48 M/UL (ref 4–5.2)
S PNEUM AG UR QL: NORMAL
SCHISTOCYTES BLD QL SMEAR: ABNORMAL
SLIDE REVIEW: ABNORMAL
SODIUM SERPL-SCNC: 141 MMOL/L (ref 136–145)
WBC # BLD AUTO: 7.4 K/UL (ref 4–11)

## 2024-01-23 PROCEDURE — 83735 ASSAY OF MAGNESIUM: CPT

## 2024-01-23 PROCEDURE — 2580000003 HC RX 258: Performed by: NURSE PRACTITIONER

## 2024-01-23 PROCEDURE — 97530 THERAPEUTIC ACTIVITIES: CPT

## 2024-01-23 PROCEDURE — 94640 AIRWAY INHALATION TREATMENT: CPT

## 2024-01-23 PROCEDURE — 94761 N-INVAS EAR/PLS OXIMETRY MLT: CPT

## 2024-01-23 PROCEDURE — 1200000000 HC SEMI PRIVATE

## 2024-01-23 PROCEDURE — 86140 C-REACTIVE PROTEIN: CPT

## 2024-01-23 PROCEDURE — 51798 US URINE CAPACITY MEASURE: CPT

## 2024-01-23 PROCEDURE — 85025 COMPLETE CBC W/AUTO DIFF WBC: CPT

## 2024-01-23 PROCEDURE — 6370000000 HC RX 637 (ALT 250 FOR IP): Performed by: NURSE PRACTITIONER

## 2024-01-23 PROCEDURE — 2700000000 HC OXYGEN THERAPY PER DAY

## 2024-01-23 PROCEDURE — XW0DXM6 INTRODUCTION OF BARICITINIB INTO MOUTH AND PHARYNX, EXTERNAL APPROACH, NEW TECHNOLOGY GROUP 6: ICD-10-PCS | Performed by: INTERNAL MEDICINE

## 2024-01-23 PROCEDURE — 84100 ASSAY OF PHOSPHORUS: CPT

## 2024-01-23 PROCEDURE — 6370000000 HC RX 637 (ALT 250 FOR IP): Performed by: INTERNAL MEDICINE

## 2024-01-23 PROCEDURE — 2580000003 HC RX 258: Performed by: STUDENT IN AN ORGANIZED HEALTH CARE EDUCATION/TRAINING PROGRAM

## 2024-01-23 PROCEDURE — 36415 COLL VENOUS BLD VENIPUNCTURE: CPT

## 2024-01-23 PROCEDURE — 6360000002 HC RX W HCPCS: Performed by: NURSE PRACTITIONER

## 2024-01-23 PROCEDURE — 6370000000 HC RX 637 (ALT 250 FOR IP): Performed by: STUDENT IN AN ORGANIZED HEALTH CARE EDUCATION/TRAINING PROGRAM

## 2024-01-23 PROCEDURE — 80053 COMPREHEN METABOLIC PANEL: CPT

## 2024-01-23 PROCEDURE — 6360000002 HC RX W HCPCS: Performed by: STUDENT IN AN ORGANIZED HEALTH CARE EDUCATION/TRAINING PROGRAM

## 2024-01-23 RX ORDER — SODIUM CHLORIDE FOR INHALATION 3 %
4 VIAL, NEBULIZER (ML) INHALATION 2 TIMES DAILY
Status: DISCONTINUED | OUTPATIENT
Start: 2024-01-23 | End: 2024-01-26 | Stop reason: HOSPADM

## 2024-01-23 RX ORDER — MAGNESIUM SULFATE IN WATER 40 MG/ML
2000 INJECTION, SOLUTION INTRAVENOUS ONCE
Status: COMPLETED | OUTPATIENT
Start: 2024-01-23 | End: 2024-01-23

## 2024-01-23 RX ADMIN — IPRATROPIUM BROMIDE AND ALBUTEROL SULFATE 1 DOSE: 2.5; .5 SOLUTION RESPIRATORY (INHALATION) at 07:48

## 2024-01-23 RX ADMIN — GUAIFENESIN 600 MG: 600 TABLET, EXTENDED RELEASE ORAL at 11:39

## 2024-01-23 RX ADMIN — IPRATROPIUM BROMIDE AND ALBUTEROL SULFATE 1 DOSE: 2.5; .5 SOLUTION RESPIRATORY (INHALATION) at 13:55

## 2024-01-23 RX ADMIN — ACETAMINOPHEN 650 MG: 325 TABLET ORAL at 21:54

## 2024-01-23 RX ADMIN — Medication 4 ML: at 13:55

## 2024-01-23 RX ADMIN — ALPRAZOLAM 0.25 MG: 0.25 TABLET ORAL at 21:54

## 2024-01-23 RX ADMIN — ASPIRIN 81 MG 81 MG: 81 TABLET ORAL at 11:39

## 2024-01-23 RX ADMIN — PANTOPRAZOLE SODIUM 40 MG: 40 TABLET, DELAYED RELEASE ORAL at 06:21

## 2024-01-23 RX ADMIN — CITALOPRAM HYDROBROMIDE 40 MG: 20 TABLET ORAL at 11:39

## 2024-01-23 RX ADMIN — SODIUM CHLORIDE, PRESERVATIVE FREE 10 ML: 5 INJECTION INTRAVENOUS at 21:55

## 2024-01-23 RX ADMIN — Medication 4 ML: at 20:57

## 2024-01-23 RX ADMIN — CARVEDILOL 12.5 MG: 6.25 TABLET, FILM COATED ORAL at 11:38

## 2024-01-23 RX ADMIN — SODIUM CHLORIDE, PRESERVATIVE FREE 10 ML: 5 INJECTION INTRAVENOUS at 11:44

## 2024-01-23 RX ADMIN — CARVEDILOL 12.5 MG: 6.25 TABLET, FILM COATED ORAL at 17:32

## 2024-01-23 RX ADMIN — CLOPIDOGREL BISULFATE 75 MG: 75 TABLET ORAL at 11:39

## 2024-01-23 RX ADMIN — SODIUM CHLORIDE: 9 INJECTION, SOLUTION INTRAVENOUS at 12:17

## 2024-01-23 RX ADMIN — BARICITINIB 2 MG: 2 TABLET, FILM COATED ORAL at 11:40

## 2024-01-23 RX ADMIN — GUAIFENESIN 600 MG: 600 TABLET, EXTENDED RELEASE ORAL at 21:54

## 2024-01-23 RX ADMIN — ENOXAPARIN SODIUM 40 MG: 100 INJECTION SUBCUTANEOUS at 11:41

## 2024-01-23 RX ADMIN — IPRATROPIUM BROMIDE AND ALBUTEROL SULFATE 1 DOSE: 2.5; .5 SOLUTION RESPIRATORY (INHALATION) at 20:56

## 2024-01-23 RX ADMIN — MAGNESIUM SULFATE HEPTAHYDRATE 2000 MG: 40 INJECTION, SOLUTION INTRAVENOUS at 12:18

## 2024-01-23 RX ADMIN — FOLIC ACID 1 MG: 1 TABLET ORAL at 11:39

## 2024-01-23 RX ADMIN — ATORVASTATIN CALCIUM 40 MG: 40 TABLET, FILM COATED ORAL at 11:39

## 2024-01-23 ASSESSMENT — PAIN DESCRIPTION - ONSET: ONSET: GRADUAL

## 2024-01-23 ASSESSMENT — PAIN DESCRIPTION - FREQUENCY: FREQUENCY: INTERMITTENT

## 2024-01-23 ASSESSMENT — PAIN DESCRIPTION - PAIN TYPE: TYPE: ACUTE PAIN

## 2024-01-23 ASSESSMENT — PAIN SCALES - GENERAL
PAINLEVEL_OUTOF10: 2
PAINLEVEL_OUTOF10: 0

## 2024-01-23 ASSESSMENT — PAIN DESCRIPTION - ORIENTATION: ORIENTATION: MID

## 2024-01-23 ASSESSMENT — PAIN DESCRIPTION - LOCATION: LOCATION: THROAT

## 2024-01-23 ASSESSMENT — PAIN - FUNCTIONAL ASSESSMENT: PAIN_FUNCTIONAL_ASSESSMENT: ACTIVITIES ARE NOT PREVENTED

## 2024-01-23 ASSESSMENT — PAIN DESCRIPTION - DESCRIPTORS: DESCRIPTORS: DISCOMFORT

## 2024-01-23 NOTE — PROGRESS NOTES
Admit Date: 1/21/2024  Diet: ADULT DIET; Regular; Low Fat/Low Chol/High Fiber/2 gm Na    CC: Shortness of breath    Interval history:   Overnight, there were no acute events. Patient's vitals remained stable    Patient was seen this morning sitting in bed.  Patient endorses that she has a cough but she is not strong enough to produce any phlegm. Patient denies fevers, chills, nausea, vomiting, chest pain, diarrhea, constipation, dysuria, urinary frequency or urgency.     Plan:     -Continue Decadron 6 mg IV daily  -Add baricitinib 2 mg p.o. daily for 14 doses due to elevated CRP >75  -3% nebulized saline/Mucinex  -Home O2 eval tomorrow morning    Assessment:   Jazmín Vazquez is a 83 y.o. female with PMH of CHF, A-fib, HLD, GERD  who was admitted with hypoxia and bilateral pneumonia/COVID    Hypoxia   Likely secondary to bilateral pneumonia, COVID   Admit inpatient with telemetry  Patient has diffuse rhonchi with weak cough  Mucinex, duonebs  IV Decadron given hypoxia with COVID  IV antibiotics, bilateral opacities on CXR, fevers at ECF,   Blood cultures  Droplet plus isolation   Oxygen to keep sats >92%  PT, OT     Atrial Fibrillation  Continue BB, rate controlled  Previous Watchman, not on anticoagulation     Left Eye Conjunctivitis   Large amount of crusted drainage, with redness     Hyperlipidemia  Continue statin     Hx CHF  BNP elevated in ER with troponin, repeat troponin now, patient denies any CP  On exam she appears dry, will hold IV lasix for now and monitor     Code Status: Full Code   FEN: ADULT DIET; Regular; Low Fat/Low Chol/High Fiber/2 gm Na   PPX: Lovenox  DISPO: GEORGE Tariq MD  1/23/2024,  12:26 PM    This note was likely completed using voice recognition technology and may contain unintended errors.     Objective:   Vitals:   T-max:  Patient Vitals for the past 8 hrs:   BP Temp Temp src Pulse Resp SpO2 Weight   01/23/24 1130 124/86 97.3 °F (36.3 °C) Oral 79 20 97 % --   01/23/24 0749

## 2024-01-23 NOTE — PROGRESS NOTES
[facility] - Inpatient Rehabilitation Department   Phone: [phone]    Occupational Therapy    [x] Initial Evaluation            [] Daily Treatment Note         [] Discharge Summary      Patient: Jazmín Vazquez   : 1940   MRN: [id number]   Date of Service:  2024    Admitting Diagnosis:  Community acquired pneumonia, bilateral  Current Admission Summary: Per EMR \"Jazmín Vazquez is a 83 y.o. female with pmh of CHF, A-fib, HLD, GERD who presents with cough and shortness of breath.  Patient from [facility] who has been experiencing 2 weeks of cough and shortness of breath.  Patient's voice is hoarse and difficult to understand but states that she has not felt well for 2 weeks and they have been ignoring her at [facility].  She reports cough but difficulty coughing up sputum.  Fevers off and on at [facility] per EMS report.   She denies any N/V.  She denies any pain.\"  Past Medical History:  has a past medical history of Anxiety, Atherosclerotic heart disease of native coronary artery without angina pectoris, Cardiac pacemaker, Cervicalgia, Chronic kidney disease, stage 3 (HCC), Chronic systolic (congestive) heart failure (HCC), Depression, Essential hypertension, Failure to thrive in adult, GERD (gastroesophageal reflux disease), Hyperlipidemia, Ischemic cardiomyopathy, Other dysphagia, Paroxysmal A-fib (HCC), Primary generalized hypertrophic osteoarthrosis, Pulmonary hypertension (HCC), Rhabdomyolysis, Sick sinus syndrome (HCC), Sleep apnea, Spondylosis with myelopathy, lumbar region, and Tremor, unspecified.  Past Surgical History:  has no past surgical history on file.    Discharge Recommendations: Jazmín Vazquez scored a 15/24 on the AM-PAC ADL Inpatient form. Current research shows that an AM-PAC score of 17 or less is typically not associated with a discharge to the patient's home setting. Based on the patient's AM-PAC score and their current ADL deficits, it is recommended that the patient have 3-5 sessions

## 2024-01-23 NOTE — ACP (ADVANCE CARE PLANNING)
Advanced Care Planning Note.    Purpose of Encounter: Advanced care planning in light of SOB, cough, COVID  Parties In Attendance: Patient  Decisional Capacity: Yes  Subjective: Patient has COVID  Objective: Cr 1.2  Goals of Care Determination: Patient wants full support  Plan:  IV Steroids, Baricitinib, Neb saline  Code Status: Full   Time spent on Advanced care Plannin minutes  Advanced Care Planning Documents: Completed advanced directives on chart, natali Bergman, is the POA.    Stanton Tariq MD  2024 12:35 PM

## 2024-01-23 NOTE — PROGRESS NOTES
Morning assessment and medications complete, pt cooperated and tolerated well. Medications given per MAR. VS stable. A&O X4. Pt states no pain at this time. Patient clean and dry, external cath patent and draining well. Clean, dry and intact. Tele monitor on. Breakfast tray set up. Bed side table, call light and belongings within reach. Bed locked and in lowest position, bed alarm active and audible. All questions and concerns addressed, no further needs at this time.

## 2024-01-23 NOTE — PLAN OF CARE
Problem: Discharge Planning  Goal: Discharge to home or other facility with appropriate resources  Outcome: Progressing     Problem: Skin/Tissue Integrity  Goal: Absence of new skin breakdown  Description: 1.  Monitor for areas of redness and/or skin breakdown  2.  Assess vascular access sites hourly  3.  Every 4-6 hours minimum:  Change oxygen saturation probe site  4.  Every 4-6 hours:  If on nasal continuous positive airway pressure, respiratory therapy assess nares and determine need for appliance change or resting period.  1/22/2024 2159 by Emelyn Shukla, RN  Outcome: Progressing  1/22/2024 1059 by Sandra Leary, RN  Outcome: Progressing     Problem: Safety - Adult  Goal: Free from fall injury  1/22/2024 2159 by Emelyn Shukla, RN  Outcome: Progressing  1/22/2024 1059 by Sandra Leary, RN  Outcome: Progressing

## 2024-01-23 NOTE — THERAPY EVALUATION
[facility] - Inpatient Rehabilitation Department   Phone: [phone]    Physical Therapy    [x] Initial Evaluation            [] Daily Treatment Note         [] Discharge Summary      Patient: Jazmín Vazquez   : 1940   MRN: [id number]   Date of Service:  2024  Admitting Diagnosis: Community acquired pneumonia, bilateral  Current Admission Summary:   Per EMR \"Jazmín Vazquez is a 83 y.o. female with pmh of CHF, A-fib, HLD, GERD who presents with cough and shortness of breath.  Patient from [facility] who has been experiencing 2 weeks of cough and shortness of breath.  Patient's voice is hoarse and difficult to understand but states that she has not felt well for 2 weeks and they have been ignoring her at [facility].  She reports cough but difficulty coughing up sputum.  Fevers off and on at [facility] per EMS report.   She denies any N/V.  She denies any pain.\"  Being treated for COVID and PNA.    Past Medical History:  has a past medical history of Anxiety, Atherosclerotic heart disease of native coronary artery without angina pectoris, Cardiac pacemaker, Cervicalgia, Chronic kidney disease, stage 3 (HCC), Chronic systolic (congestive) heart failure (HCC), Depression, Essential hypertension, Failure to thrive in adult, GERD (gastroesophageal reflux disease), Hyperlipidemia, Ischemic cardiomyopathy, Other dysphagia, Paroxysmal A-fib (HCC), Primary generalized hypertrophic osteoarthrosis, Pulmonary hypertension (HCC), Rhabdomyolysis, Sick sinus syndrome (HCC), Sleep apnea, Spondylosis with myelopathy, lumbar region, and Tremor, unspecified.  Past Surgical History:  has no past surgical history on file.  Discharge Recommendations: Jazmín Vazquez scored a  on the AM-PAC short mobility form. Current research shows that an AM-PAC score of 17 or less is typically not associated with a discharge to the patient's home setting. Based on the patient's AM-PAC score and their current functional mobility deficits, it is

## 2024-01-23 NOTE — PLAN OF CARE
Problem: Discharge Planning  Goal: Discharge to home or other facility with appropriate resources  1/23/2024 0745 by Opal Schaeffer RN  Outcome: Progressing  1/22/2024 2159 by Emelyn Shukla RN  Outcome: Progressing     Problem: Skin/Tissue Integrity  Goal: Absence of new skin breakdown  Description: 1.  Monitor for areas of redness and/or skin breakdown  2.  Assess vascular access sites hourly  3.  Every 4-6 hours minimum:  Change oxygen saturation probe site  4.  Every 4-6 hours:  If on nasal continuous positive airway pressure, respiratory therapy assess nares and determine need for appliance change or resting period.  1/23/2024 0745 by Opal Schaeffer, RN  Outcome: Progressing  1/22/2024 2159 by Emelyn Shukla, RN  Outcome: Progressing     Problem: Safety - Adult  Goal: Free from fall injury  1/23/2024 0745 by Opal Schaeffer RN  Outcome: Progressing  Flowsheets (Taken 1/23/2024 0744)  Free From Fall Injury: Instruct family/caregiver on patient safety  1/22/2024 2159 by Emelyn Shukla, RN  Outcome: Progressing

## 2024-01-23 NOTE — PROGRESS NOTES
Physician Progress Note      PATIENT:               LORNE MIRANDA  CSN #:                  [id number]  :                       1940  ADMIT DATE:       2024 10:22 PM  DISCH DATE:  RESPONDING  PROVIDER #:        CAROLE MOORE MD          QUERY TEXT:    Patient admitted with covid and pna. Noted documentation of acute respiratory   failure in HP. In order to support the diagnosis of acute respiratory failure,   please include additional clinical indicators in your documentation.  Or   please document if the diagnosis of acute respiratory failure has been ruled   out after further study.        The medical record reflects the following:  Risk Factors: 82 yo with covid  Clinical Indicators: 89% RA placed on 2L. VBG-pH7.428, pCO2 27.4, pO2 133.0  HP, \"Respiratory distress present.\"  Treatment: 2L O2    Acute Respiratory Failure Clinical Indicators per  MS-DRG Training Guide and   Quick Reference Guide:  pO2 < 60 mmHg or SpO2 (pulse oximetry) < 91% breathing room air  pCO2 > 50 and pH < 7.35  P/F ratio (pO2 / FIO2) < 300  pO2 decrease or pCO2 increase by 10 mmHg from baseline (if known)  Supplemental oxygen of 40% or more  Presence of respiratory distress, tachypnea, dyspnea, shortness of breath,   wheezing  Unable to speak in complete sentences  Use of accessory muscles to breathe  Extreme anxiety and feeling of impending doom  Tripod position  Confusion/altered mental status/obtunded  Options provided:  -- Acute Respiratory Failure ruled out after study  -- Acute Respiratory Failure as evidenced by, Please document evidence.  -- Other - I will add my own diagnosis  -- Disagree - Not applicable / Not valid  -- Disagree - Clinically unable to determine / Unknown  -- Refer to Clinical Documentation Reviewer    PROVIDER RESPONSE TEXT:    Acute Respiratory Failure has been ruled out after study.    Query created by: Rosi Zeng on 2024 2:41 PM      Electronically signed by:  CAROLE MOORE MD 2024 3:49

## 2024-01-24 LAB
ACANTHOCYTES BLD QL SMEAR: ABNORMAL
ALBUMIN SERPL-MCNC: 2.9 G/DL (ref 3.4–5)
ANION GAP SERPL CALCULATED.3IONS-SCNC: 12 MMOL/L (ref 3–16)
ANISOCYTOSIS BLD QL SMEAR: ABNORMAL
BASOPHILS # BLD: 0 K/UL (ref 0–0.2)
BASOPHILS NFR BLD: 0 %
BUN SERPL-MCNC: 47 MG/DL (ref 7–20)
BURR CELLS BLD QL SMEAR: ABNORMAL
CALCIUM SERPL-MCNC: 8.8 MG/DL (ref 8.3–10.6)
CHLORIDE SERPL-SCNC: 105 MMOL/L (ref 99–110)
CK SERPL-CCNC: 19 U/L (ref 26–192)
CO2 SERPL-SCNC: 19 MMOL/L (ref 21–32)
CREAT SERPL-MCNC: 1.6 MG/DL (ref 0.6–1.2)
DEPRECATED RDW RBC AUTO: 15.8 % (ref 12.4–15.4)
EOSINOPHIL # BLD: 0 K/UL (ref 0–0.6)
EOSINOPHIL NFR BLD: 0 %
GFR SERPLBLD CREATININE-BSD FMLA CKD-EPI: 32 ML/MIN/{1.73_M2}
GLUCOSE SERPL-MCNC: 124 MG/DL (ref 70–99)
HCT VFR BLD AUTO: 32.8 % (ref 36–48)
HGB BLD-MCNC: 10.8 G/DL (ref 12–16)
LYMPHOCYTES # BLD: 0.6 K/UL (ref 1–5.1)
LYMPHOCYTES NFR BLD: 6 %
MAGNESIUM SERPL-MCNC: 2.4 MG/DL (ref 1.8–2.4)
MCH RBC QN AUTO: 29.4 PG (ref 26–34)
MCHC RBC AUTO-ENTMCNC: 33 G/DL (ref 31–36)
MCV RBC AUTO: 89.1 FL (ref 80–100)
MONOCYTES # BLD: 0.5 K/UL (ref 0–1.3)
MONOCYTES NFR BLD: 5 %
MYELOCYTES NFR BLD MANUAL: 1 %
NEUTROPHILS # BLD: 9.1 K/UL (ref 1.7–7.7)
NEUTROPHILS NFR BLD: 86 %
NEUTS BAND NFR BLD MANUAL: 2 % (ref 0–7)
PHOSPHATE SERPL-MCNC: 3 MG/DL (ref 2.5–4.9)
PLATELET # BLD AUTO: 284 K/UL (ref 135–450)
PLATELET BLD QL SMEAR: ADEQUATE
PMV BLD AUTO: 8.2 FL (ref 5–10.5)
POTASSIUM SERPL-SCNC: 4.3 MMOL/L (ref 3.5–5.1)
RBC # BLD AUTO: 3.69 M/UL (ref 4–5.2)
SLIDE REVIEW: ABNORMAL
SODIUM SERPL-SCNC: 136 MMOL/L (ref 136–145)
WBC # BLD AUTO: 10.2 K/UL (ref 4–11)

## 2024-01-24 PROCEDURE — 97530 THERAPEUTIC ACTIVITIES: CPT

## 2024-01-24 PROCEDURE — 80069 RENAL FUNCTION PANEL: CPT

## 2024-01-24 PROCEDURE — 2700000000 HC OXYGEN THERAPY PER DAY

## 2024-01-24 PROCEDURE — 82550 ASSAY OF CK (CPK): CPT

## 2024-01-24 PROCEDURE — 2580000003 HC RX 258: Performed by: NURSE PRACTITIONER

## 2024-01-24 PROCEDURE — 6370000000 HC RX 637 (ALT 250 FOR IP): Performed by: NURSE PRACTITIONER

## 2024-01-24 PROCEDURE — 1200000000 HC SEMI PRIVATE

## 2024-01-24 PROCEDURE — 94761 N-INVAS EAR/PLS OXIMETRY MLT: CPT

## 2024-01-24 PROCEDURE — 6360000002 HC RX W HCPCS: Performed by: STUDENT IN AN ORGANIZED HEALTH CARE EDUCATION/TRAINING PROGRAM

## 2024-01-24 PROCEDURE — 2580000003 HC RX 258: Performed by: STUDENT IN AN ORGANIZED HEALTH CARE EDUCATION/TRAINING PROGRAM

## 2024-01-24 PROCEDURE — 83735 ASSAY OF MAGNESIUM: CPT

## 2024-01-24 PROCEDURE — 6370000000 HC RX 637 (ALT 250 FOR IP): Performed by: INTERNAL MEDICINE

## 2024-01-24 PROCEDURE — 51798 US URINE CAPACITY MEASURE: CPT

## 2024-01-24 PROCEDURE — 85025 COMPLETE CBC W/AUTO DIFF WBC: CPT

## 2024-01-24 PROCEDURE — 94640 AIRWAY INHALATION TREATMENT: CPT

## 2024-01-24 PROCEDURE — 2500000003 HC RX 250 WO HCPCS: Performed by: STUDENT IN AN ORGANIZED HEALTH CARE EDUCATION/TRAINING PROGRAM

## 2024-01-24 PROCEDURE — 6370000000 HC RX 637 (ALT 250 FOR IP): Performed by: STUDENT IN AN ORGANIZED HEALTH CARE EDUCATION/TRAINING PROGRAM

## 2024-01-24 PROCEDURE — 36415 COLL VENOUS BLD VENIPUNCTURE: CPT

## 2024-01-24 PROCEDURE — 94669 MECHANICAL CHEST WALL OSCILL: CPT

## 2024-01-24 PROCEDURE — 94680 O2 UPTK RST&XERS DIR SIMPLE: CPT

## 2024-01-24 RX ORDER — SODIUM CHLORIDE, SODIUM LACTATE, POTASSIUM CHLORIDE, CALCIUM CHLORIDE 600; 310; 30; 20 MG/100ML; MG/100ML; MG/100ML; MG/100ML
INJECTION, SOLUTION INTRAVENOUS CONTINUOUS
Status: ACTIVE | OUTPATIENT
Start: 2024-01-24 | End: 2024-01-25

## 2024-01-24 RX ORDER — HEPARIN SODIUM 5000 [USP'U]/ML
5000 INJECTION, SOLUTION INTRAVENOUS; SUBCUTANEOUS EVERY 8 HOURS SCHEDULED
Status: DISCONTINUED | OUTPATIENT
Start: 2024-01-24 | End: 2024-01-26 | Stop reason: HOSPADM

## 2024-01-24 RX ADMIN — HEPARIN SODIUM 5000 UNITS: 5000 INJECTION INTRAVENOUS; SUBCUTANEOUS at 14:47

## 2024-01-24 RX ADMIN — MICONAZOLE NITRATE: 20 POWDER TOPICAL at 21:33

## 2024-01-24 RX ADMIN — SODIUM CHLORIDE, PRESERVATIVE FREE 10 ML: 5 INJECTION INTRAVENOUS at 08:06

## 2024-01-24 RX ADMIN — ALPRAZOLAM 0.25 MG: 0.25 TABLET ORAL at 21:33

## 2024-01-24 RX ADMIN — PANTOPRAZOLE SODIUM 40 MG: 40 TABLET, DELAYED RELEASE ORAL at 06:09

## 2024-01-24 RX ADMIN — GUAIFENESIN 600 MG: 600 TABLET, EXTENDED RELEASE ORAL at 08:05

## 2024-01-24 RX ADMIN — DEXAMETHASONE SODIUM PHOSPHATE 6 MG: 10 INJECTION, SOLUTION INTRAMUSCULAR; INTRAVENOUS at 01:04

## 2024-01-24 RX ADMIN — CITALOPRAM HYDROBROMIDE 40 MG: 20 TABLET ORAL at 08:05

## 2024-01-24 RX ADMIN — SODIUM CHLORIDE, POTASSIUM CHLORIDE, SODIUM LACTATE AND CALCIUM CHLORIDE: 600; 310; 30; 20 INJECTION, SOLUTION INTRAVENOUS at 20:01

## 2024-01-24 RX ADMIN — DEXAMETHASONE SODIUM PHOSPHATE 6 MG: 10 INJECTION, SOLUTION INTRAMUSCULAR; INTRAVENOUS at 21:33

## 2024-01-24 RX ADMIN — BARICITINIB 2 MG: 2 TABLET, FILM COATED ORAL at 08:04

## 2024-01-24 RX ADMIN — CARVEDILOL 12.5 MG: 6.25 TABLET, FILM COATED ORAL at 08:05

## 2024-01-24 RX ADMIN — IPRATROPIUM BROMIDE AND ALBUTEROL SULFATE 1 DOSE: 2.5; .5 SOLUTION RESPIRATORY (INHALATION) at 20:57

## 2024-01-24 RX ADMIN — IPRATROPIUM BROMIDE AND ALBUTEROL SULFATE 1 DOSE: 2.5; .5 SOLUTION RESPIRATORY (INHALATION) at 16:01

## 2024-01-24 RX ADMIN — IPRATROPIUM BROMIDE AND ALBUTEROL SULFATE 1 DOSE: 2.5; .5 SOLUTION RESPIRATORY (INHALATION) at 10:09

## 2024-01-24 RX ADMIN — SODIUM CHLORIDE, POTASSIUM CHLORIDE, SODIUM LACTATE AND CALCIUM CHLORIDE: 600; 310; 30; 20 INJECTION, SOLUTION INTRAVENOUS at 09:25

## 2024-01-24 RX ADMIN — CARVEDILOL 12.5 MG: 6.25 TABLET, FILM COATED ORAL at 18:38

## 2024-01-24 RX ADMIN — IPRATROPIUM BROMIDE AND ALBUTEROL SULFATE 1 DOSE: 2.5; .5 SOLUTION RESPIRATORY (INHALATION) at 13:00

## 2024-01-24 RX ADMIN — CLOPIDOGREL BISULFATE 75 MG: 75 TABLET ORAL at 08:05

## 2024-01-24 RX ADMIN — Medication 4 ML: at 10:09

## 2024-01-24 RX ADMIN — GUAIFENESIN 600 MG: 600 TABLET, EXTENDED RELEASE ORAL at 21:33

## 2024-01-24 RX ADMIN — ATORVASTATIN CALCIUM 40 MG: 40 TABLET, FILM COATED ORAL at 08:06

## 2024-01-24 RX ADMIN — ASPIRIN 81 MG 81 MG: 81 TABLET ORAL at 08:05

## 2024-01-24 RX ADMIN — HEPARIN SODIUM 5000 UNITS: 5000 INJECTION INTRAVENOUS; SUBCUTANEOUS at 21:32

## 2024-01-24 RX ADMIN — FOLIC ACID 1 MG: 1 TABLET ORAL at 08:06

## 2024-01-24 RX ADMIN — MICONAZOLE NITRATE: 20 POWDER TOPICAL at 11:55

## 2024-01-24 RX ADMIN — Medication 4 ML: at 20:57

## 2024-01-24 RX ADMIN — HEPARIN SODIUM 5000 UNITS: 5000 INJECTION INTRAVENOUS; SUBCUTANEOUS at 09:28

## 2024-01-24 NOTE — THERAPY EVALUATION
[facility] - Inpatient Rehabilitation Department   Phone: [phone]    Physical Therapy    [] Initial Evaluation            [x] Daily Treatment Note         [] Discharge Summary      Patient: Jazmín Vazquez   : 1940   MRN: [id number]   Date of Service:  2024  Admitting Diagnosis: Community acquired pneumonia, bilateral  Current Admission Summary:   Per EMR \"Jazmín Vazquez is a 83 y.o. female with pmh of CHF, A-fib, HLD, GERD who presents with cough and shortness of breath.  Patient from [facility] who has been experiencing 2 weeks of cough and shortness of breath.  Patient's voice is hoarse and difficult to understand but states that she has not felt well for 2 weeks and they have been ignoring her at [facility].  She reports cough but difficulty coughing up sputum.  Fevers off and on at [facility] per EMS report.   She denies any N/V.  She denies any pain.\"  Being treated for COVID and PNA.    Past Medical History:  has a past medical history of Anxiety, Atherosclerotic heart disease of native coronary artery without angina pectoris, Cardiac pacemaker, Cervicalgia, Chronic kidney disease, stage 3 (HCC), Chronic systolic (congestive) heart failure (HCC), Depression, Essential hypertension, Failure to thrive in adult, GERD (gastroesophageal reflux disease), Hyperlipidemia, Ischemic cardiomyopathy, Other dysphagia, Paroxysmal A-fib (HCC), Primary generalized hypertrophic osteoarthrosis, Pulmonary hypertension (HCC), Rhabdomyolysis, Sick sinus syndrome (HCC), Sleep apnea, Spondylosis with myelopathy, lumbar region, and Tremor, unspecified.  Past Surgical History:  has no past surgical history on file.  Discharge Recommendations: Jazmín Vazquez scored a  on the AM-PAC short mobility form. Current research shows that an AM-PAC score of 17 or less is typically not associated with a discharge to the patient's home setting. Based on the patient's AM-PAC score and their current functional mobility deficits, it is  wears glasses at all times  Hearing:   WFL  Observation:   General Observation:  On 2 L O2        Subjective  General: Supine in bed upon arrival. Daughter present. Improved vocal quality this date on RA. Agreeable to PT/OT treatment.  Pain: 0/10  Pain Interventions: not applicable       Functional Mobility  Bed Mobility:  Supine to Sit: moderate assistance  Scooting: minimal assistance  Comments: Pt coming to sit at EOB and completing reaching activity  Transfers:  Sit to stand transfer: 2 person assist with mod A x2  Sit to stand transfer: 2 person assist with min A x2  Stand step transfer: 2 person assist with mod A x2  Comments: slow pérez for transfer, posterior lean, assistance for walker management.  Ambulation:  Ambulation not tested on this date secondary to fatigue and overall weakness.  Stair Mobility:  Stair mobility not completed on this date.  Comments:  Wheelchair Mobility:  No w/c mobility completed on this date.  Comments:  Balance:  Static Sitting Balance: fair (+): maintains balance at SBA/supervision without use of UE support  Dynamic Sitting Balance: fair: maintains balance at CGA without use of UE support  Static Standing Balance: poor: requires mod (A) to maintain balance  Dynamic Standing Balance: poor (-): requires max (A) to maintain balance  Comments: Posterior lean when standing at EOB. After ~30 seconds standing, pt reporting lightheadedness and returning to sitting.     Other Therapeutic Interventions      Functional Outcomes  -PAC Inpatient Mobility Raw Score : 11              Cognition  Arousal/Alertness: delayed responses to stimuli (as session progressed able to respond to stimuli at a faster speed)  Following Commands: follows one step commands with repetition, follows one step commands with increased time  Safety Judgement: decreased awareness of need for assistance, decreased awareness of need for safety  Problem Solving: assistance required to generate

## 2024-01-24 NOTE — PROGRESS NOTES
Admit Date: 1/21/2024  Diet: ADULT DIET; Regular; Low Fat/Low Chol/High Fiber/2 gm Na    CC: Shortness of breath    Interval history:   Overnight, there were no acute events. Patient's vitals remained stable    Patient was seen this morning sitting in bed. Patient claims that she is still congested.  She is having a cough.  She claims she is mildly improving.  Patient denies fevers, chills, nausea, vomiting, chest pain, diarrhea, constipation, dysuria, urinary frequency or urgency.     Plan:     -Continue Decadron 6 mg IV daily  -Continue Baricitinib 2 mg p.o. daily for 14 doses due to elevated CRP >75  -3% nebulized saline/Mucinex  -Home O2 eval   -IVF hydration  -Fena pending  -Will wait for the recommendation from nephrology    Assessment:   Jazmín Vazquez is a 83 y.o. female with PMH of CHF, A-fib, HLD, GERD  who was admitted with hypoxia and bilateral pneumonia/COVID    Hypoxia   Likely secondary to bilateral pneumonia, COVID   Admit inpatient with telemetry  Patient has diffuse rhonchi with weak cough  Mucinex, duonebs  IV Decadron given hypoxia with COVID  IV antibiotics, bilateral opacities on CXR, fevers at [facility],   Blood cultures  Droplet plus isolation   Oxygen to keep sats >92%  PT, OT     ABELARDO  Likely secondary to prerenal  Patient's Cr increased from 1.2-1.6 in 24 hours.  -Nephrology consulted, appreciate recs    Atrial Fibrillation  Continue BB, rate controlled  Previous Watchman, not on anticoagulation     Left Eye Conjunctivitis   Large amount of crusted drainage, with redness     Hyperlipidemia  Continue statin     Hx CHF  BNP elevated in ER with troponin, repeat troponin now, patient denies any CP  On exam she appears dry, will hold IV lasix for now and monitor     Code Status: Full Code   FEN: ADULT DIET; Regular; Low Fat/Low Chol/High Fiber/2 gm Na   PPX: Subq heparin  DISPO: GMF    Stanton Tariq MD  1/24/2024,  11:39 AM    This note was likely completed using voice recognition technology and  Oral Daily    pantoprazole  40 mg Oral QAM AC    sodium chloride flush  5-40 mL IntraVENous 2 times per day    dexAMETHasone  6 mg IntraVENous Q24H    folic acid  1 mg Oral Daily     Continuous Infusions:   lactated ringers IV soln 100 mL/hr at 01/24/24 0925    sodium chloride 20 mL/hr at 01/23/24 1217     PRN Meds:ALPRAZolam, sodium chloride flush, sodium chloride, potassium chloride **OR** potassium alternative oral replacement **OR** potassium chloride, magnesium sulfate, ondansetron **OR** ondansetron, polyethylene glycol, acetaminophen **OR** acetaminophen

## 2024-01-24 NOTE — CONSULTS
[facility]  Consultation Note    Reason for Consult:  ABELARDO, low serum HCO3  Requesting Physician:  Dr. HI Tariq    CHIEF COMPLAINT:  cough and SOB    History obtained from records and patient.    HISTORY OF PRESENT ILLNESS:                Jazmín Vazquez  is 83 y.o. y.o. female with significant past medical history of Anxiety, CAD, CHF, EF-50-55, mod to severe TR, Pulm. HTN, Rhabdomyolysis, Sick Sinus Syndrome who presents with persistent coughing and SOB and now admitted for Pneumonia.  I am asked to see the patient since crea up to 1.6 from 0.9.  Lowest sbp in the 100's range. No recent contrast exposure.  Received Lasix doses.  +15ml since admission.  Denies any vomiting nor diarrhea.  No regular NSAID use.  No  new skin rash.  No change in urine output.     Past Medical History:     has a past medical history of Anxiety, Atherosclerotic heart disease of native coronary artery without angina pectoris, Cardiac pacemaker, Cervicalgia, Chronic kidney disease, stage 3 (HCC), Chronic systolic (congestive) heart failure (HCC), Depression, Essential hypertension, Failure to thrive in adult, GERD (gastroesophageal reflux disease), Hyperlipidemia, Ischemic cardiomyopathy, Other dysphagia, Paroxysmal A-fib (HCC), Primary generalized hypertrophic osteoarthrosis, Pulmonary hypertension (HCC), Rhabdomyolysis, Sick sinus syndrome (HCC), Sleep apnea, Spondylosis with myelopathy, lumbar region, and Tremor, unspecified.   Past Surgical History:     has no past surgical history on file.   Current Medications:    Current Facility-Administered Medications: heparin (porcine) injection 5,000 Units, 5,000 Units, SubCUTAneous, 3 times per day  lactated ringers IV soln infusion, , IntraVENous, Continuous  miconazole (MICOTIN) 2 % powder, , Topical, BID  baricitinib (OLUMIANT) tablet 2 mg, 2 mg, Oral, Daily  sodium chloride (Inhalant) 3 % nebulizer solution 4 mL, 4 mL, Nebulization, BID  ipratropium 0.5

## 2024-01-24 NOTE — PROGRESS NOTES
[facility] - Inpatient Rehabilitation Department   Phone: [phone]    Occupational Therapy    [x] Initial Evaluation            [] Daily Treatment Note         [] Discharge Summary      Patient: Jazmín Vazquez   : 1940   MRN: [id number]   Date of Service:  2024    Admitting Diagnosis:  Community acquired pneumonia, bilateral  Current Admission Summary: Per EMR \"Jazmín Vazquez is a 83 y.o. female with pmh of CHF, A-fib, HLD, GERD who presents with cough and shortness of breath.  Patient from [facility] who has been experiencing 2 weeks of cough and shortness of breath.  Patient's voice is hoarse and difficult to understand but states that she has not felt well for 2 weeks and they have been ignoring her at [facility].  She reports cough but difficulty coughing up sputum.  Fevers off and on at [facility] per EMS report.   She denies any N/V.  She denies any pain.\"  Past Medical History:  has a past medical history of Anxiety, Atherosclerotic heart disease of native coronary artery without angina pectoris, Cardiac pacemaker, Cervicalgia, Chronic kidney disease, stage 3 (HCC), Chronic systolic (congestive) heart failure (HCC), Depression, Essential hypertension, Failure to thrive in adult, GERD (gastroesophageal reflux disease), Hyperlipidemia, Ischemic cardiomyopathy, Other dysphagia, Paroxysmal A-fib (HCC), Primary generalized hypertrophic osteoarthrosis, Pulmonary hypertension (HCC), Rhabdomyolysis, Sick sinus syndrome (HCC), Sleep apnea, Spondylosis with myelopathy, lumbar region, and Tremor, unspecified.  Past Surgical History:  has no past surgical history on file.    Discharge Recommendations: Jazmín Vazquez scored a 15/24 on the AM-PAC ADL Inpatient form. Current research shows that an AM-PAC score of 17 or less is typically not associated with a discharge to the patient's home setting. Based on the patient's AM-PAC score and their current ADL deficits, it is recommended that the patient have 3-5 sessions  recommendations  Learning Assessment:  patient verbalizes understanding, would benefit from continued reinforcement    Assessment  Activity Tolerance: Fair- pt was able to transfer from bed to recliner chair with mod A x2. Pt had increased fatigue throughout session. Pt's vitals monitored and remained stable throughout.     Impairments Requiring Therapeutic Intervention: decreased functional mobility, decreased ADL status, decreased ROM, decreased strength, decreased safety awareness, decreased cognition, decreased endurance, decreased balance  Prognosis: fair  Clinical Assessment: The patient is a 83 y.o. female who presents below their baseline level of function due to above deficits after dx of PNA and COVID. Pt would benefit from continued skilled OT to increase strength, balance, endurance, fxnl mobility, safety, and independence to promote safe discharge and return to PLOF. Continue POC.   Safety Interventions: patient left in chair, chair alarm in place, call light within reach, gait belt, patient at risk for falls, and nurse notified    Plan  Frequency: 3-5 x/per week  Current Treatment Recommendations: strengthening, ROM, balance training, functional mobility training, transfer training, endurance training, and ADL/self-care training    Goals  Short Term Goals:  Time Frame: Upon discharge  Patient will complete upper body ADL at contact guard assistance   Patient will complete lower body ADL at moderate assistance   Patient will complete toileting at moderate assistance   Patient will complete grooming at contact guard assistance   Patient will complete functional transfers at minimal assistance   Patient will increase functional sitting balance to Fair + for improved ADL completion  Patient will increase functional standing balance to Fair - for improved ADL completion    Above goals reviewed on 1/24/2024.  All goals are ongoing at this time unless indicated above.       Therapy Session Time     Individual

## 2024-01-24 NOTE — PLAN OF CARE
Plan of care reviewed. Reports no questions. Pt remains free from falls. Bed alarm engaged. Call light in reach. Fall risk armband on pt wrist.

## 2024-01-24 NOTE — CARE COORDINATION
Discharge Planning.     Pre-cert still pending for Home at [facility] at this time.    Electronically signed by RENETTA Rousseau on 1/24/2024 at 3:19 PM

## 2024-01-24 NOTE — PROGRESS NOTES
01/24/24 1321   Resting (Room Air)   SpO2 93   HR 72   During Walk (Room Air)   SpO2 90   HR 85   Walk/Assistance Device Walker   Rate of Dyspnea 0   After Walk   SpO2 95   HR 72   Rate of Dyspnea 0   Does the Patient Qualify for Home O2 No   Does the Patient Need Portable Oxygen Tanks No

## 2024-01-25 LAB
ACANTHOCYTES BLD QL SMEAR: ABNORMAL
ALBUMIN SERPL-MCNC: 2.9 G/DL (ref 3.4–5)
ANION GAP SERPL CALCULATED.3IONS-SCNC: 11 MMOL/L (ref 3–16)
ANISOCYTOSIS BLD QL SMEAR: ABNORMAL
BASOPHILS # BLD: 0 K/UL (ref 0–0.2)
BASOPHILS NFR BLD: 0 %
BUN SERPL-MCNC: 51 MG/DL (ref 7–20)
BURR CELLS BLD QL SMEAR: ABNORMAL
CALCIUM SERPL-MCNC: 9.1 MG/DL (ref 8.3–10.6)
CHLORIDE SERPL-SCNC: 104 MMOL/L (ref 99–110)
CO2 SERPL-SCNC: 19 MMOL/L (ref 21–32)
CREAT SERPL-MCNC: 1.7 MG/DL (ref 0.6–1.2)
DEPRECATED RDW RBC AUTO: 16.4 % (ref 12.4–15.4)
DOHLE BOD BLD QL SMEAR: PRESENT
EOSINOPHIL # BLD: 0 K/UL (ref 0–0.6)
EOSINOPHIL NFR BLD: 0 %
GFR SERPLBLD CREATININE-BSD FMLA CKD-EPI: 30 ML/MIN/{1.73_M2}
GLUCOSE SERPL-MCNC: 136 MG/DL (ref 70–99)
HCT VFR BLD AUTO: 31.6 % (ref 36–48)
HGB BLD-MCNC: 10.2 G/DL (ref 12–16)
LYMPHOCYTES # BLD: 1.5 K/UL (ref 1–5.1)
LYMPHOCYTES NFR BLD: 11 %
MAGNESIUM SERPL-MCNC: 2.2 MG/DL (ref 1.8–2.4)
MCH RBC QN AUTO: 29.3 PG (ref 26–34)
MCHC RBC AUTO-ENTMCNC: 32.4 G/DL (ref 31–36)
MCV RBC AUTO: 90.6 FL (ref 80–100)
METAMYELOCYTES NFR BLD MANUAL: 2 %
MONOCYTES # BLD: 0.5 K/UL (ref 0–1.3)
MONOCYTES NFR BLD: 4 %
NEUTROPHILS # BLD: 10.2 K/UL (ref 1.7–7.7)
NEUTROPHILS NFR BLD: 82 %
NEUTS VAC BLD QL SMEAR: PRESENT
PHOSPHATE SERPL-MCNC: 3.6 MG/DL (ref 2.5–4.9)
PLATELET # BLD AUTO: 263 K/UL (ref 135–450)
PLATELET BLD QL SMEAR: ADEQUATE
PMV BLD AUTO: 8.2 FL (ref 5–10.5)
POIKILOCYTOSIS BLD QL SMEAR: ABNORMAL
POTASSIUM SERPL-SCNC: 4.8 MMOL/L (ref 3.5–5.1)
RBC # BLD AUTO: 3.48 M/UL (ref 4–5.2)
SCHISTOCYTES BLD QL SMEAR: ABNORMAL
SLIDE REVIEW: ABNORMAL
SODIUM SERPL-SCNC: 134 MMOL/L (ref 136–145)
TOXIC GRANULES BLD QL SMEAR: PRESENT
VARIANT LYMPHS NFR BLD MANUAL: 1 % (ref 0–6)
WBC # BLD AUTO: 12.2 K/UL (ref 4–11)

## 2024-01-25 PROCEDURE — 6370000000 HC RX 637 (ALT 250 FOR IP): Performed by: NURSE PRACTITIONER

## 2024-01-25 PROCEDURE — 97530 THERAPEUTIC ACTIVITIES: CPT

## 2024-01-25 PROCEDURE — 6370000000 HC RX 637 (ALT 250 FOR IP): Performed by: STUDENT IN AN ORGANIZED HEALTH CARE EDUCATION/TRAINING PROGRAM

## 2024-01-25 PROCEDURE — 36415 COLL VENOUS BLD VENIPUNCTURE: CPT

## 2024-01-25 PROCEDURE — 97535 SELF CARE MNGMENT TRAINING: CPT

## 2024-01-25 PROCEDURE — 94761 N-INVAS EAR/PLS OXIMETRY MLT: CPT

## 2024-01-25 PROCEDURE — 6360000002 HC RX W HCPCS: Performed by: STUDENT IN AN ORGANIZED HEALTH CARE EDUCATION/TRAINING PROGRAM

## 2024-01-25 PROCEDURE — 6370000000 HC RX 637 (ALT 250 FOR IP): Performed by: INTERNAL MEDICINE

## 2024-01-25 PROCEDURE — 85025 COMPLETE CBC W/AUTO DIFF WBC: CPT

## 2024-01-25 PROCEDURE — 94640 AIRWAY INHALATION TREATMENT: CPT

## 2024-01-25 PROCEDURE — 2580000003 HC RX 258: Performed by: STUDENT IN AN ORGANIZED HEALTH CARE EDUCATION/TRAINING PROGRAM

## 2024-01-25 PROCEDURE — 80069 RENAL FUNCTION PANEL: CPT

## 2024-01-25 PROCEDURE — 94669 MECHANICAL CHEST WALL OSCILL: CPT

## 2024-01-25 PROCEDURE — 2580000003 HC RX 258: Performed by: NURSE PRACTITIONER

## 2024-01-25 PROCEDURE — 83735 ASSAY OF MAGNESIUM: CPT

## 2024-01-25 PROCEDURE — 1200000000 HC SEMI PRIVATE

## 2024-01-25 RX ORDER — ALBUTEROL SULFATE 2.5 MG/3ML
2.5 SOLUTION RESPIRATORY (INHALATION)
Status: DISCONTINUED | OUTPATIENT
Start: 2024-01-25 | End: 2024-01-26

## 2024-01-25 RX ORDER — SODIUM CHLORIDE, SODIUM LACTATE, POTASSIUM CHLORIDE, CALCIUM CHLORIDE 600; 310; 30; 20 MG/100ML; MG/100ML; MG/100ML; MG/100ML
INJECTION, SOLUTION INTRAVENOUS CONTINUOUS
Status: DISCONTINUED | OUTPATIENT
Start: 2024-01-25 | End: 2024-01-25

## 2024-01-25 RX ADMIN — ATORVASTATIN CALCIUM 40 MG: 40 TABLET, FILM COATED ORAL at 10:20

## 2024-01-25 RX ADMIN — CITALOPRAM HYDROBROMIDE 40 MG: 20 TABLET ORAL at 10:20

## 2024-01-25 RX ADMIN — CLOPIDOGREL BISULFATE 75 MG: 75 TABLET ORAL at 10:20

## 2024-01-25 RX ADMIN — DEXAMETHASONE SODIUM PHOSPHATE 6 MG: 10 INJECTION, SOLUTION INTRAMUSCULAR; INTRAVENOUS at 21:36

## 2024-01-25 RX ADMIN — HEPARIN SODIUM 5000 UNITS: 5000 INJECTION INTRAVENOUS; SUBCUTANEOUS at 21:36

## 2024-01-25 RX ADMIN — CARVEDILOL 12.5 MG: 6.25 TABLET, FILM COATED ORAL at 10:19

## 2024-01-25 RX ADMIN — HEPARIN SODIUM 5000 UNITS: 5000 INJECTION INTRAVENOUS; SUBCUTANEOUS at 05:38

## 2024-01-25 RX ADMIN — PANTOPRAZOLE SODIUM 40 MG: 40 TABLET, DELAYED RELEASE ORAL at 05:37

## 2024-01-25 RX ADMIN — MICONAZOLE NITRATE: 20 POWDER TOPICAL at 21:36

## 2024-01-25 RX ADMIN — MICONAZOLE NITRATE: 20 POWDER TOPICAL at 10:22

## 2024-01-25 RX ADMIN — ALBUTEROL SULFATE 2.5 MG: 2.5 SOLUTION RESPIRATORY (INHALATION) at 21:47

## 2024-01-25 RX ADMIN — SODIUM CHLORIDE, PRESERVATIVE FREE 10 ML: 5 INJECTION INTRAVENOUS at 21:36

## 2024-01-25 RX ADMIN — FOLIC ACID 1 MG: 1 TABLET ORAL at 10:19

## 2024-01-25 RX ADMIN — GUAIFENESIN 600 MG: 600 TABLET, EXTENDED RELEASE ORAL at 21:36

## 2024-01-25 RX ADMIN — GUAIFENESIN 600 MG: 600 TABLET, EXTENDED RELEASE ORAL at 10:19

## 2024-01-25 RX ADMIN — ASPIRIN 81 MG 81 MG: 81 TABLET ORAL at 10:20

## 2024-01-25 RX ADMIN — IPRATROPIUM BROMIDE AND ALBUTEROL SULFATE 1 DOSE: 2.5; .5 SOLUTION RESPIRATORY (INHALATION) at 09:08

## 2024-01-25 RX ADMIN — Medication 4 ML: at 09:08

## 2024-01-25 RX ADMIN — HEPARIN SODIUM 5000 UNITS: 5000 INJECTION INTRAVENOUS; SUBCUTANEOUS at 16:47

## 2024-01-25 RX ADMIN — Medication 4 ML: at 21:47

## 2024-01-25 RX ADMIN — IPRATROPIUM BROMIDE AND ALBUTEROL SULFATE 1 DOSE: 2.5; .5 SOLUTION RESPIRATORY (INHALATION) at 17:39

## 2024-01-25 RX ADMIN — CARVEDILOL 12.5 MG: 6.25 TABLET, FILM COATED ORAL at 16:47

## 2024-01-25 RX ADMIN — BARICITINIB 2 MG: 2 TABLET, FILM COATED ORAL at 10:20

## 2024-01-25 RX ADMIN — SODIUM CHLORIDE, PRESERVATIVE FREE 10 ML: 5 INJECTION INTRAVENOUS at 10:22

## 2024-01-25 RX ADMIN — SODIUM CHLORIDE, POTASSIUM CHLORIDE, SODIUM LACTATE AND CALCIUM CHLORIDE: 600; 310; 30; 20 INJECTION, SOLUTION INTRAVENOUS at 05:37

## 2024-01-25 ASSESSMENT — ENCOUNTER SYMPTOMS
FACIAL SWELLING: 0
PHOTOPHOBIA: 0
EYE REDNESS: 0
DIARRHEA: 0
COUGH: 1
CONSTIPATION: 0
CHEST TIGHTNESS: 0
VOMITING: 0
ABDOMINAL DISTENTION: 0
NAUSEA: 0
EYE DISCHARGE: 0
ABDOMINAL PAIN: 0
BLOOD IN STOOL: 0
SHORTNESS OF BREATH: 0

## 2024-01-25 ASSESSMENT — PAIN SCALES - GENERAL: PAINLEVEL_OUTOF10: 0

## 2024-01-25 NOTE — PROGRESS NOTES
[facility] - Inpatient Rehabilitation Department   Phone: [phone]    Occupational Therapy    [] Initial Evaluation            [x] Daily Treatment Note         [] Discharge Summary      Patient: Jazmín Vazquez   : 1940   MRN: [id number]   Date of Service:  2024    Admitting Diagnosis:  Community acquired pneumonia, bilateral  Current Admission Summary: Per EMR \"Jazmín Vazquez is a 83 y.o. female with pmh of CHF, A-fib, HLD, GERD who presents with cough and shortness of breath.  Patient from [facility] who has been experiencing 2 weeks of cough and shortness of breath.  Patient's voice is hoarse and difficult to understand but states that she has not felt well for 2 weeks and they have been ignoring her at [facility].  She reports cough but difficulty coughing up sputum.  Fevers off and on at [facility] per EMS report.   She denies any N/V.  She denies any pain.\"  Past Medical History:  has a past medical history of Anxiety, Atherosclerotic heart disease of native coronary artery without angina pectoris, Cardiac pacemaker, Cervicalgia, Chronic kidney disease, stage 3 (HCC), Chronic systolic (congestive) heart failure (HCC), Depression, Essential hypertension, Failure to thrive in adult, GERD (gastroesophageal reflux disease), Hyperlipidemia, Ischemic cardiomyopathy, Other dysphagia, Paroxysmal A-fib (HCC), Primary generalized hypertrophic osteoarthrosis, Pulmonary hypertension (HCC), Rhabdomyolysis, Sick sinus syndrome (HCC), Sleep apnea, Spondylosis with myelopathy, lumbar region, and Tremor, unspecified.  Past Surgical History:  has no past surgical history on file.    Discharge Recommendations: Jazmín Vazquez scored a 15/24 on the AM-PAC ADL Inpatient form. Current research shows that an AM-PAC score of 17 or less is typically not associated with a discharge to the patient's home setting. Based on the patient's AM-PAC score and their current ADL deficits, it is recommended that the patient have 3-5 sessions  completed stand step from recliner > bed with a distance of 2 ft requiring MOD A x1 and MIN/MOD A x1 with increased assist required as pt fatigued. Pt also required A for walker management during fxnl mobility. Once EOB pt completed x2 STS to doff soiled brief, perform konstantin-care, and don new brief. Pt completed STSs with MOD A x2 progressing to MAX A x2 due to fatigue while being TD for konstantin-care. Fair carry over for hand placement, requiring only x2 verbal cues for correct placement. Pt required occasional cues to stand with upright posture. When changing brief, pt noted with dried blood on soiled brief due to irritation area but no new blood located. Nurse notified of irritation area and the reason for not replacing purewick. Pt attempted to return to supine from seated position EOB but required MOD A to get legs onto bed. Pt TD for scooting upward in bed once supine. Pt left with all needs met, bed alarm on, and call light within reach.     Functional Outcomes  AM-PAC Inpatient Daily Activity Raw Score: 15    Cognition  Overall Cognitive Status: Impaired  Arousal/Alertness: inconsistent responses to stimuli  Following Commands: follows one step commands with increased time  Memory: decreased recall of recent events, decreased short term memory, decreased long term memory  Insights: decreased awareness of deficits  Initiation: requires cues for some  Orientation:    alert and oriented x 4  Command Following:   accurately follows one step commands     Education  Barriers To Learning: cognition  Patient Education: patient educated on proper use of assistive device/equipment, energy conservation, transfer training, wound/irritation area prevention  Learning Assessment:  patient verbalizes understanding, would benefit from continued reinforcement    Assessment  Activity Tolerance: Fair- pt was able to transfer from bed > bedside commode > recliner chair this date. Pt had increased fatigue throughout session. Pt noted

## 2024-01-25 NOTE — PROGRESS NOTES
[facility] Note    Patient Active Problem List   Diagnosis    Community acquired pneumonia, bilateral       Past Medical History:   has a past medical history of Anxiety, Atherosclerotic heart disease of native coronary artery without angina pectoris, Cardiac pacemaker, Cervicalgia, Chronic kidney disease, stage 3 (HCC), Chronic systolic (congestive) heart failure (HCC), Depression, Essential hypertension, Failure to thrive in adult, GERD (gastroesophageal reflux disease), Hyperlipidemia, Ischemic cardiomyopathy, Other dysphagia, Paroxysmal A-fib ([facility]), Primary generalized hypertrophic osteoarthrosis, Pulmonary hypertension ([facility]), Rhabdomyolysis, Sick sinus syndrome ([facility]), Sleep apnea, Spondylosis with myelopathy, lumbar region, and Tremor, unspecified.    Past Social History:   reports that she has never smoked. She has never used smokeless tobacco. She reports that she does not drink alcohol and does not use drugs.    Subjective:    Sitting in chair   Less SOB and coughing    Review of Systems   Constitutional:  Positive for fatigue. Negative for activity change, appetite change, chills, fever and unexpected weight change.   HENT:  Negative for congestion and facial swelling.    Eyes:  Negative for photophobia, discharge and redness.   Respiratory:  Positive for cough. Negative for chest tightness and shortness of breath.    Cardiovascular:  Negative for chest pain, palpitations and leg swelling.   Gastrointestinal:  Negative for abdominal distention, abdominal pain, blood in stool, constipation, diarrhea, nausea and vomiting.   Endocrine: Negative for cold intolerance, heat intolerance and polyuria.   Genitourinary:  Negative for decreased urine volume, difficulty urinating, flank pain and hematuria.   Musculoskeletal:  Negative for joint swelling and neck pain.   Neurological:  Negative for dizziness, seizures, syncope, speech difficulty, light-headedness and headaches.

## 2024-01-25 NOTE — PROGRESS NOTES
Morning assessment and medications complete, pt cooperated and tolerated well. Medications given per MAR. VS stable. Pt states no pain at this time. A&O X4. Patient clean and dry.  External cath patent and draining well. Clean, dry and intact. Tele monitor on. Breakfast tray set up. Bed side table, call light and belongings within reach. Bed locked and in lowest position, bed alarm active and audible. All questions and concerns addressed, no further needs at this time.

## 2024-01-25 NOTE — PLAN OF CARE
Problem: Discharge Planning  Goal: Discharge to home or other facility with appropriate resources  1/24/2024 2237 by Melanie Bates RN  Outcome: Progressing     Problem: Skin/Tissue Integrity  Goal: Absence of new skin breakdown  Description: 1.  Monitor for areas of redness and/or skin breakdown  2.  Assess vascular access sites hourly  3.  Every 4-6 hours minimum:  Change oxygen saturation probe site  4.  Every 4-6 hours:  If on nasal continuous positive airway pressure, respiratory therapy assess nares and determine need for appliance change or resting period.  Outcome: Progressing     Problem: Safety - Adult  Goal: Free from fall injury  Outcome: Progressing     Problem: Pain  Goal: Verbalizes/displays adequate comfort level or baseline comfort level  Outcome: Progressing     Problem: ABCDS Injury Assessment  Goal: Absence of physical injury  Outcome: Progressing

## 2024-01-25 NOTE — PROGRESS NOTES
Shift assessment completed. VSS, scheduled meds given/held per MAR orders. Pt is A&Ox4, does not seem to be in any distress resting comfortably in bed her O2 was 96-98%, told her if she felt like she was SOB while trying to sleep to let me know, will spot check over night. Pt has no complaints or needs at this time. Brakes locked, bed in lowest position, bed alarm engaged. All belongings and call light within reach.

## 2024-01-25 NOTE — PLAN OF CARE
Problem: Discharge Planning  Goal: Discharge to home or other facility with appropriate resources  1/25/2024 0755 by Opal Schaeffer RN  Outcome: Progressing  1/24/2024 2237 by Melanie Bates RN  Outcome: Progressing     Problem: Skin/Tissue Integrity  Goal: Absence of new skin breakdown  Description: 1.  Monitor for areas of redness and/or skin breakdown  2.  Assess vascular access sites hourly  3.  Every 4-6 hours minimum:  Change oxygen saturation probe site  4.  Every 4-6 hours:  If on nasal continuous positive airway pressure, respiratory therapy assess nares and determine need for appliance change or resting period.  1/25/2024 0755 by Opal Schaeffer RN  Outcome: Progressing  1/24/2024 2237 by Melanie Bates RN  Outcome: Progressing     Problem: Safety - Adult  Goal: Free from fall injury  1/25/2024 0755 by Opal Schaeffer RN  Outcome: Progressing  Flowsheets (Taken 1/25/2024 0755)  Free From Fall Injury: Instruct family/caregiver on patient safety  1/24/2024 2237 by Melanie Bates, RN  Outcome: Progressing

## 2024-01-25 NOTE — PROGRESS NOTES
[facility] - Inpatient Rehabilitation Department   Phone: [phone]    Physical Therapy    [] Initial Evaluation            [x] Daily Treatment Note         [] Discharge Summary      Patient: Jazmín Vazquez   : 1940   MRN: [id number]   Date of Service:  2024  Admitting Diagnosis: Community acquired pneumonia, bilateral  Current Admission Summary:   Per EMR \"Jazmín Vazquez is a 83 y.o. female with pmh of CHF, A-fib, HLD, GERD who presents with cough and shortness of breath.  Patient from [facility] who has been experiencing 2 weeks of cough and shortness of breath.  Patient's voice is hoarse and difficult to understand but states that she has not felt well for 2 weeks and they have been ignoring her at [facility].  She reports cough but difficulty coughing up sputum.  Fevers off and on at [facility] per EMS report.   She denies any N/V.  She denies any pain.\"  Being treated for COVID and PNA.    Past Medical History:  has a past medical history of Anxiety, Atherosclerotic heart disease of native coronary artery without angina pectoris, Cardiac pacemaker, Cervicalgia, Chronic kidney disease, stage 3 (HCC), Chronic systolic (congestive) heart failure (HCC), Depression, Essential hypertension, Failure to thrive in adult, GERD (gastroesophageal reflux disease), Hyperlipidemia, Ischemic cardiomyopathy, Other dysphagia, Paroxysmal A-fib (HCC), Primary generalized hypertrophic osteoarthrosis, Pulmonary hypertension (HCC), Rhabdomyolysis, Sick sinus syndrome (HCC), Sleep apnea, Spondylosis with myelopathy, lumbar region, and Tremor, unspecified.  Past Surgical History:  has no past surgical history on file.    Discharge Recommendations: Jazmín Vazquez scored a 9/24 on the AM-PAC short mobility form. Current research shows that an AM-PAC score of 17 or less is typically not associated with a discharge to the patient's home setting. Based on the patient's AM-PAC score and their current functional mobility deficits, it is

## 2024-01-25 NOTE — CARE COORDINATION
Discharge planning.     The SW received a call from cecily the admission coordinator at [facility] who informed the SW that pre-cert has been approved for the pt to return to [facility]. The pt can go when medical ready.    Electronically signed by RENETTA Rousseau on 1/25/2024 at 8:31 AM

## 2024-01-25 NOTE — PROGRESS NOTES
Admit Date: 1/21/2024  Diet: ADULT DIET; Regular; Low Fat/Low Chol/High Fiber/2 gm Na    CC: Shortness of breath    Interval history:   Overnight, there were no acute events. Patient's vitals remained stable    Patient was seen this morning sitting in bed.  Patient had her voice back.  She is sounding much better.  She is happy that she is making good improvement.  Patient denies fevers, chills, nausea, vomiting, chest pain, diarrhea, constipation, dysuria, urinary frequency or urgency.     Plan:     -Continue Decadron 6 mg IV daily  -Continue Baricitinib 2 mg p.o. daily for 14 doses due to elevated CRP >75  -3% nebulized saline/Mucinex/Chest vest  -FeNa pending  -IVF hydration  -Continue to monitor creatinine, uptrending    Assessment:   Jazmín Vazquez is a 83 y.o. female with PMH of CHF, A-fib, HLD, GERD  who was admitted with hypoxia and bilateral pneumonia/COVID    Hypoxia   Likely secondary to bilateral pneumonia, COVID   Admit inpatient with telemetry  Patient has diffuse rhonchi with weak cough  Mucinex, duonebs  IV Decadron given hypoxia with COVID  IV antibiotics, bilateral opacities on CXR, fevers at [facility],   Blood cultures  Droplet plus isolation   Oxygen to keep sats >92%  PT, OT     ABELARDO  Likely secondary to prerenal  Patient's Cr increased from 1.2-1.6 in 24 hours.  -Nephrology consulted, appreciate recs    Atrial Fibrillation  Continue BB, rate controlled  Previous Watchman, not on anticoagulation     Left Eye Conjunctivitis   Large amount of crusted drainage, with redness     Hyperlipidemia  Continue statin     Hx CHF  BNP elevated in ER with troponin, repeat troponin now, patient denies any CP  On exam she appears dry, will hold IV lasix for now and monitor     Code Status: Full Code   FEN: ADULT DIET; Regular; Low Fat/Low Chol/High Fiber/2 gm Na   PPX: Subq heparin  DISPO: GMF    Stanton Tariq MD  1/25/2024,  11:12 AM    This note was likely completed using voice recognition technology and may  Labs     01/23/24  0537 01/24/24  0520 01/25/24  0544    136 134*   K 4.8 4.3 4.8    105 104   CO2 18* 19* 19*   BUN 40* 47* 51*   CREATININE 1.2 1.6* 1.7*   GLUCOSE 104* 124* 136*   CALCIUM 9.0 8.8 9.1   MG 1.70* 2.40 2.20   PHOS 3.1 3.0 3.6   ANIONGAP 13 12 11       Hepatic:   Recent Labs     01/23/24  0537 01/24/24 0520 01/25/24  0544   AST 25  --   --    ALT 19  --   --    BILITOT <0.2  --   --    PROT 5.6*  --   --    LABALBU 2.6* 2.9* 2.9*   ALKPHOS 95  --   --        Troponin: No results for input(s): \"TROPONINI\" in the last 72 hours.  BNP: No results for input(s): \"BNP\" in the last 72 hours.  Lipids: No results for input(s): \"CHOL\", \"HDL\" in the last 72 hours.    Invalid input(s): \"LDLCALCU\", \"TRIGLYCERIDE\"  ABGs:  No results for input(s): \"PHART\", \"HWB8AQW\", \"PO2ART\", \"JCX5UUX\", \"BEART\", \"THGBART\", \"N0TYZSLW\", \"MEK6ICC\" in the last 72 hours.    INR: No results for input(s): \"INR\" in the last 72 hours.  Lactate: No results for input(s): \"LACTATE\" in the last 72 hours.  Cultures:  -----------------------------------------------------------------  RAD:   XR CHEST PORTABLE   Final Result   Bilateral patchy pneumonia.             Medications:     Scheduled Meds:   heparin (porcine)  5,000 Units SubCUTAneous 3 times per day    miconazole   Topical BID    baricitinib  2 mg Oral Daily    sodium chloride (Inhalant)  4 mL Nebulization BID    ipratropium 0.5 mg-albuterol 2.5 mg  1 Dose Inhalation Q4H WA RT    guaiFENesin  600 mg Oral BID    aspirin  81 mg Oral Daily    atorvastatin  40 mg Oral Daily    carvedilol  12.5 mg Oral BID WC    citalopram  40 mg Oral Daily    clopidogrel  75 mg Oral Daily    pantoprazole  40 mg Oral QAM AC    sodium chloride flush  5-40 mL IntraVENous 2 times per day    dexAMETHasone  6 mg IntraVENous Q24H    folic acid  1 mg Oral Daily     Continuous Infusions:   sodium chloride 20 mL/hr at 01/23/24 1217     PRN Meds:ALPRAZolam, sodium chloride flush, sodium chloride, potassium

## 2024-01-26 VITALS
OXYGEN SATURATION: 98 % | RESPIRATION RATE: 14 BRPM | HEART RATE: 70 BPM | BODY MASS INDEX: 30.88 KG/M2 | DIASTOLIC BLOOD PRESSURE: 94 MMHG | TEMPERATURE: 97.5 F | SYSTOLIC BLOOD PRESSURE: 150 MMHG | WEIGHT: 174.3 LBS | HEIGHT: 63 IN

## 2024-01-26 LAB
ACANTHOCYTES BLD QL SMEAR: ABNORMAL
ALBUMIN SERPL-MCNC: 2.8 G/DL (ref 3.4–5)
ANION GAP SERPL CALCULATED.3IONS-SCNC: 10 MMOL/L (ref 3–16)
ANISOCYTOSIS BLD QL SMEAR: ABNORMAL
BACTERIA BLD CULT ORG #2: NORMAL
BACTERIA BLD CULT: NORMAL
BASOPHILS # BLD: 0 K/UL (ref 0–0.2)
BASOPHILS NFR BLD: 0 %
BUN SERPL-MCNC: 51 MG/DL (ref 7–20)
BURR CELLS BLD QL SMEAR: ABNORMAL
CALCIUM SERPL-MCNC: 9 MG/DL (ref 8.3–10.6)
CHLORIDE SERPL-SCNC: 105 MMOL/L (ref 99–110)
CO2 SERPL-SCNC: 18 MMOL/L (ref 21–32)
CREAT SERPL-MCNC: 1.4 MG/DL (ref 0.6–1.2)
DEPRECATED RDW RBC AUTO: 15.7 % (ref 12.4–15.4)
EOSINOPHIL # BLD: 0 K/UL (ref 0–0.6)
EOSINOPHIL NFR BLD: 0 %
GFR SERPLBLD CREATININE-BSD FMLA CKD-EPI: 37 ML/MIN/{1.73_M2}
GLUCOSE SERPL-MCNC: 114 MG/DL (ref 70–99)
HCT VFR BLD AUTO: 31.3 % (ref 36–48)
HGB BLD-MCNC: 10.4 G/DL (ref 12–16)
LYMPHOCYTES # BLD: 1.4 K/UL (ref 1–5.1)
LYMPHOCYTES NFR BLD: 12 %
MAGNESIUM SERPL-MCNC: 2.2 MG/DL (ref 1.8–2.4)
MCH RBC QN AUTO: 29.6 PG (ref 26–34)
MCHC RBC AUTO-ENTMCNC: 33.3 G/DL (ref 31–36)
MCV RBC AUTO: 88.9 FL (ref 80–100)
METAMYELOCYTES NFR BLD MANUAL: 3 %
MONOCYTES # BLD: 0.2 K/UL (ref 0–1.3)
MONOCYTES NFR BLD: 2 %
MYELOCYTES NFR BLD MANUAL: 3 %
NEUTROPHILS # BLD: 10.2 K/UL (ref 1.7–7.7)
NEUTROPHILS NFR BLD: 80 %
PHOSPHATE SERPL-MCNC: 3.5 MG/DL (ref 2.5–4.9)
PLATELET # BLD AUTO: 252 K/UL (ref 135–450)
PLATELET BLD QL SMEAR: ADEQUATE
PMV BLD AUTO: 7.9 FL (ref 5–10.5)
POIKILOCYTOSIS BLD QL SMEAR: ABNORMAL
POTASSIUM SERPL-SCNC: 4.8 MMOL/L (ref 3.5–5.1)
RBC # BLD AUTO: 3.52 M/UL (ref 4–5.2)
SCHISTOCYTES BLD QL SMEAR: ABNORMAL
SLIDE REVIEW: ABNORMAL
SODIUM SERPL-SCNC: 133 MMOL/L (ref 136–145)
WBC # BLD AUTO: 11.9 K/UL (ref 4–11)

## 2024-01-26 PROCEDURE — 6360000002 HC RX W HCPCS: Performed by: STUDENT IN AN ORGANIZED HEALTH CARE EDUCATION/TRAINING PROGRAM

## 2024-01-26 PROCEDURE — 97110 THERAPEUTIC EXERCISES: CPT

## 2024-01-26 PROCEDURE — 83735 ASSAY OF MAGNESIUM: CPT

## 2024-01-26 PROCEDURE — 36415 COLL VENOUS BLD VENIPUNCTURE: CPT

## 2024-01-26 PROCEDURE — 94640 AIRWAY INHALATION TREATMENT: CPT

## 2024-01-26 PROCEDURE — 97530 THERAPEUTIC ACTIVITIES: CPT

## 2024-01-26 PROCEDURE — 6370000000 HC RX 637 (ALT 250 FOR IP): Performed by: STUDENT IN AN ORGANIZED HEALTH CARE EDUCATION/TRAINING PROGRAM

## 2024-01-26 PROCEDURE — 80069 RENAL FUNCTION PANEL: CPT

## 2024-01-26 PROCEDURE — 93971 EXTREMITY STUDY: CPT

## 2024-01-26 PROCEDURE — 2580000003 HC RX 258: Performed by: STUDENT IN AN ORGANIZED HEALTH CARE EDUCATION/TRAINING PROGRAM

## 2024-01-26 PROCEDURE — 6370000000 HC RX 637 (ALT 250 FOR IP): Performed by: NURSE PRACTITIONER

## 2024-01-26 PROCEDURE — 94669 MECHANICAL CHEST WALL OSCILL: CPT

## 2024-01-26 PROCEDURE — 94761 N-INVAS EAR/PLS OXIMETRY MLT: CPT

## 2024-01-26 PROCEDURE — 6370000000 HC RX 637 (ALT 250 FOR IP): Performed by: INTERNAL MEDICINE

## 2024-01-26 PROCEDURE — 2580000003 HC RX 258: Performed by: NURSE PRACTITIONER

## 2024-01-26 PROCEDURE — 85025 COMPLETE CBC W/AUTO DIFF WBC: CPT

## 2024-01-26 RX ORDER — DEXAMETHASONE 4 MG/1
6 TABLET ORAL DAILY
Status: DISCONTINUED | OUTPATIENT
Start: 2024-01-26 | End: 2024-01-26 | Stop reason: HOSPADM

## 2024-01-26 RX ORDER — FOLIC ACID 1 MG/1
1 TABLET ORAL DAILY
Qty: 30 TABLET | Refills: 3 | Status: SHIPPED | OUTPATIENT
Start: 2024-01-27

## 2024-01-26 RX ORDER — IPRATROPIUM BROMIDE AND ALBUTEROL SULFATE 2.5; .5 MG/3ML; MG/3ML
1 SOLUTION RESPIRATORY (INHALATION)
Status: DISCONTINUED | OUTPATIENT
Start: 2024-01-26 | End: 2024-01-26 | Stop reason: HOSPADM

## 2024-01-26 RX ORDER — DEXAMETHASONE 6 MG/1
6 TABLET ORAL DAILY
Qty: 7 TABLET | Refills: 0 | Status: CANCELLED | OUTPATIENT
Start: 2024-01-27 | End: 2024-02-03

## 2024-01-26 RX ADMIN — HEPARIN SODIUM 5000 UNITS: 5000 INJECTION INTRAVENOUS; SUBCUTANEOUS at 15:08

## 2024-01-26 RX ADMIN — HEPARIN SODIUM 5000 UNITS: 5000 INJECTION INTRAVENOUS; SUBCUTANEOUS at 06:05

## 2024-01-26 RX ADMIN — CITALOPRAM HYDROBROMIDE 40 MG: 20 TABLET ORAL at 10:00

## 2024-01-26 RX ADMIN — ALBUTEROL SULFATE 2.5 MG: 2.5 SOLUTION RESPIRATORY (INHALATION) at 11:36

## 2024-01-26 RX ADMIN — Medication 4 ML: at 08:35

## 2024-01-26 RX ADMIN — MICONAZOLE NITRATE: 20 POWDER TOPICAL at 10:01

## 2024-01-26 RX ADMIN — GUAIFENESIN 600 MG: 600 TABLET, EXTENDED RELEASE ORAL at 10:00

## 2024-01-26 RX ADMIN — CLOPIDOGREL BISULFATE 75 MG: 75 TABLET ORAL at 10:00

## 2024-01-26 RX ADMIN — ATORVASTATIN CALCIUM 40 MG: 40 TABLET, FILM COATED ORAL at 10:00

## 2024-01-26 RX ADMIN — ALBUTEROL SULFATE 2.5 MG: 2.5 SOLUTION RESPIRATORY (INHALATION) at 08:35

## 2024-01-26 RX ADMIN — PANTOPRAZOLE SODIUM 40 MG: 40 TABLET, DELAYED RELEASE ORAL at 06:05

## 2024-01-26 RX ADMIN — SODIUM CHLORIDE, PRESERVATIVE FREE 10 ML: 5 INJECTION INTRAVENOUS at 10:01

## 2024-01-26 RX ADMIN — ALBUTEROL SULFATE 2.5 MG: 2.5 SOLUTION RESPIRATORY (INHALATION) at 15:54

## 2024-01-26 RX ADMIN — FOLIC ACID 1 MG: 1 TABLET ORAL at 10:00

## 2024-01-26 RX ADMIN — CARVEDILOL 12.5 MG: 6.25 TABLET, FILM COATED ORAL at 10:00

## 2024-01-26 RX ADMIN — ASPIRIN 81 MG 81 MG: 81 TABLET ORAL at 10:00

## 2024-01-26 RX ADMIN — BARICITINIB 2 MG: 2 TABLET, FILM COATED ORAL at 10:00

## 2024-01-26 ASSESSMENT — ENCOUNTER SYMPTOMS
ABDOMINAL DISTENTION: 0
VOMITING: 0
FACIAL SWELLING: 0
EYE REDNESS: 0
PHOTOPHOBIA: 0
NAUSEA: 0
COUGH: 1
EYE DISCHARGE: 0
CONSTIPATION: 0
SHORTNESS OF BREATH: 0
DIARRHEA: 0
BLOOD IN STOOL: 0
CHEST TIGHTNESS: 0

## 2024-01-26 NOTE — PROGRESS NOTES
Attempted to call report twice to Maddie Whitman could not reach anyone each time, peripheral IV removed, pt tolerated well, pt and all pt belongings with pt and transport at time of discharge.

## 2024-01-26 NOTE — PROGRESS NOTES
[facility] Note    Patient Active Problem List   Diagnosis    Community acquired pneumonia, bilateral       Past Medical History:   has a past medical history of Anxiety, Atherosclerotic heart disease of native coronary artery without angina pectoris, Cardiac pacemaker, Cervicalgia, Chronic kidney disease, stage 3 (HCC), Chronic systolic (congestive) heart failure (HCC), Depression, Essential hypertension, Failure to thrive in adult, GERD (gastroesophageal reflux disease), Hyperlipidemia, Ischemic cardiomyopathy, Other dysphagia, Paroxysmal A-fib ([facility]), Primary generalized hypertrophic osteoarthrosis, Pulmonary hypertension ([facility]), Rhabdomyolysis, Sick sinus syndrome ([facility]), Sleep apnea, Spondylosis with myelopathy, lumbar region, and Tremor, unspecified.    Past Social History:   reports that she has never smoked. She has never used smokeless tobacco. She reports that she does not drink alcohol and does not use drugs.    Subjective:    No complaints today  Less SOB and coughing    Review of Systems   Constitutional:  Positive for fatigue. Negative for activity change, appetite change, chills, fever and unexpected weight change.   HENT:  Negative for congestion and facial swelling.    Eyes:  Negative for photophobia, discharge and redness.   Respiratory:  Positive for cough. Negative for chest tightness and shortness of breath.    Cardiovascular:  Negative for chest pain, palpitations and leg swelling.   Gastrointestinal:  Negative for abdominal distention, abdominal pain, blood in stool, constipation, diarrhea, nausea and vomiting.   Endocrine: Negative for cold intolerance, heat intolerance and polyuria.   Genitourinary:  Negative for decreased urine volume, difficulty urinating, flank pain and hematuria.   Musculoskeletal:  Negative for joint swelling and neck pain.   Neurological:  Negative for dizziness, seizures, syncope, speech difficulty, light-headedness and headaches.

## 2024-01-26 NOTE — CARE COORDINATION
Discharge Planning.     The ALLISON received a call from Dari the admissions coordinator at [facility] and asked if the pt will discharge today. Dari  stated if the pt does not discharge today  by midnight tonight a new pre-cert will need to be started. The SW informed dari the SW will update her if the pt is able to discharge today.       Electronically signed by RENETTA Rousseau on 1/26/2024 at 11:23 AM

## 2024-01-26 NOTE — PLAN OF CARE
Problem: Discharge Planning  Goal: Discharge to home or other facility with appropriate resources  1/25/2024 2153 by Navid Antunez RN  Outcome: Progressing  Flowsheets  Taken 1/25/2024 2153 by Navid Antunez RN  Discharge to home or other facility with appropriate resources: Identify discharge learning needs (meds, wound care, etc)  Problem: Skin/Tissue Integrity  Goal: Absence of new skin breakdown  Description: 1.  Monitor for areas of redness and/or skin breakdown  2.  Assess vascular access sites hourly  3.  Every 4-6 hours minimum:  Change oxygen saturation probe site  4.  Every 4-6 hours:  If on nasal continuous positive airway pressure, respiratory therapy assess nares and determine need for appliance change or resting period.  1/25/2024 2153 by Navid Antunez RN  Outcome: Progressing     Problem: Pain  Goal: Verbalizes/displays adequate comfort level or baseline comfort level  1/25/2024 2153 by Navid Antunez RN  Outcome: Progressing  Flowsheets  Taken 1/25/2024 2153 by Navid Antunez RN  Verbalizes/displays adequate comfort level or baseline comfort level: Encourage patient to monitor pain and request assistance  T  Problem: ABCDS Injury Assessment  Goal: Absence of physical injury  1/25/2024 2153 by Navid Antunez RN  Outcome: Progressing  Flowsheets (Taken 1/25/2024 2153)  Absence of Physical Injury: Implement safety measures based on patient assessment     Problem: Respiratory - Adult  Goal: Achieves optimal ventilation and oxygenation  Outcome: Progressing  Flowsheets (Taken 1/25/2024 2153)  Achieves optimal ventilation and oxygenation: Assess for changes in respiratory status     Problem: Musculoskeletal - Adult  Goal: Return mobility to safest level of function  Outcome: Progressing  Flowsheets (Taken 1/25/2024 2153)  Return Mobility to Safest Level of Function: Assess patient stability and activity tolerance for standing, transferring and ambulating with or without assistive devices

## 2024-01-26 NOTE — PROGRESS NOTES
[facility] - Inpatient Rehabilitation Department   Phone: [phone]    Physical Therapy    [] Initial Evaluation            [x] Daily Treatment Note         [] Discharge Summary      Patient: Jazmín Vazquez   : 1940   MRN: [id number]   Date of Service:  2024  Admitting Diagnosis: Community acquired pneumonia, bilateral  Current Admission Summary:   Per EMR \"Jazmín Vazquez is a 83 y.o. female with pmh of CHF, A-fib, HLD, GERD who presents with cough and shortness of breath.  Patient from [facility] who has been experiencing 2 weeks of cough and shortness of breath.  Patient's voice is hoarse and difficult to understand but states that she has not felt well for 2 weeks and they have been ignoring her at [facility].  She reports cough but difficulty coughing up sputum.  Fevers off and on at [facility] per EMS report.   She denies any N/V.  She denies any pain.\"  Being treated for COVID and PNA.    Past Medical History:  has a past medical history of Anxiety, Atherosclerotic heart disease of native coronary artery without angina pectoris, Cardiac pacemaker, Cervicalgia, Chronic kidney disease, stage 3 (HCC), Chronic systolic (congestive) heart failure (HCC), Depression, Essential hypertension, Failure to thrive in adult, GERD (gastroesophageal reflux disease), Hyperlipidemia, Ischemic cardiomyopathy, Other dysphagia, Paroxysmal A-fib (HCC), Primary generalized hypertrophic osteoarthrosis, Pulmonary hypertension (HCC), Rhabdomyolysis, Sick sinus syndrome (HCC), Sleep apnea, Spondylosis with myelopathy, lumbar region, and Tremor, unspecified.  Past Surgical History:  has no past surgical history on file.    Discharge Recommendations: Jazmín Vazquez scored a 9/24 on the AM-PAC short mobility form. Current research shows that an AM-PAC score of 17 or less is typically not associated with a discharge to the patient's home setting. Based on the patient's AM-PAC score and their current functional mobility deficits, it is

## 2024-01-26 NOTE — FLOWSHEET NOTE
Assessment completed per flow sheet. Pt denies pain or needs at this time. Pt repositioned in bed per comfort and night time medications given. POC discussed with pt for night and all questions answered.

## 2024-01-26 NOTE — DISCHARGE INSTR - COC
Continuity of Care Form    Patient Name: Jazmín Vazquez   :  1940  MRN:  [id number]    Admit date:  2024  Discharge date:  2024    Code Status Order: Full Code   Advance Directives:     Admitting Physician:  Joaquin Sparks MD  PCP: No primary care provider on file.    Discharging Nurse: DARRYL  Discharging Hospital Unit/Room#: 3AN-3313/3313-01  Discharging Unit Phone Number: [phone]    Emergency Contact:   Extended Emergency Contact Information  Primary Emergency Contact: Madalyn STEVENS  Home Phone: [phone]  Relation: Niece/Nephew    Past Surgical History:  History reviewed. No pertinent surgical history.    Immunization History:   Immunization History   Administered Date(s) Administered    COVID-19, PFIZER PURPLE top, DILUTE for use, (age 12 y+), 30mcg/0.3mL 2021, 2021, 10/02/2021       Active Problems:  Patient Active Problem List   Diagnosis Code    Community acquired pneumonia, bilateral J18.9       Isolation/Infection:   Isolation            Droplet Plus  Droplet Plus          Patient Infection Status       Infection Onset Added Last Indicated Last Indicated By Review Planned Expiration Resolved Resolved By    COVID-19 24 COVID-19 & Influenza Combo 24                         Nurse Assessment:  Last Vital Signs: BP (!) 150/94   Pulse 70   Temp 97.5 °F (36.4 °C) (Axillary)   Resp 14   Ht 1.6 m (5' 3\")   Wt 79.1 kg (174 lb 4.8 oz)   SpO2 98%   BMI 30.88 kg/m²     Last documented pain score (0-10 scale): Pain Level: 0  Last Weight:   Wt Readings from Last 1 Encounters:   24 79.1 kg (174 lb 4.8 oz)     Mental Status:  oriented and alert    IV Access:  - None    Nursing Mobility/ADLs:  Walking   Dependent  Transfer  Dependent  Bathing  Dependent  Dressing  Dependent  Toileting  Dependent  Feeding  Assisted  Med Admin  Assisted  Med Delivery   whole    Wound Care Documentation and Therapy:        Elimination:  Continence:   Bowel:

## 2024-01-26 NOTE — PROGRESS NOTES
Shift assessment completed, VSS, scheduled medications given per MAR, pt denies any pain or discomfort at this time. All questions asked and answered, pt verbalized understanding. PT L arm is swollen, cool to the touch and edematous with +2-3 pitting edema, cap refill is brisk and faint +1 radial pulse palpated at this time, pt denies any pain or discomfort in the extremity. Breaks locked, bed in lowest position and call light within reach.

## 2024-01-26 NOTE — CARE COORDINATION
Case Management -  Discharge Note      Patient Name: Jazmín Vazquez                   YOB: 1940            Readmission Risk (Low < 19, Mod (19-27), High > 27): Readmission Risk Score: 16.1    Current PCP: No primary care provider on file.    PT AM-PAC: 9 /24  OT AM-PAC: 15 /24    Patient/patient representative has been educated on the benefits of skilled nursing facility  as well as the possible risks of declining recommended services. Patient/patient representative has acknowledged the information provided and decided on the following discharge plan. Patient/ patient representative has been provided freedom of choice regarding service provider, supported by basic dialogue that supports the patient's individualized plan of care/goals.    Patient noted to have a discharge order.  Pt has been medically cleared for transition to Skilled Nursing Rehab Facility    Patient discharged to   Home at [address] [address]  Report: [phone]  Fax:  [phone]        HENS Completed:  Yes  Pre-cert required/obtained:  Yes    Transportation scheduled for 5:30pm  Transportation provided by [facility]  ([phone])   AVS faxed and agency notified:  Yes  The following prescriptions sent with pt:   Family Notified:  The SW attempted to call the pt's niece but the number listed is incorrect    Nurse to call report to facility         Financial    Payor: [employer] / Plan: [employer] MED[employer]ARE [employer]VANTAGE HMO / Product Type: Medicare /       Electronically signed by RENETTA Rousseau on 1/26/2024 at 3:26 PM

## 2024-01-26 NOTE — PLAN OF CARE
Problem: Discharge Planning  Goal: Discharge to home or other facility with appropriate resources  1/26/2024 1140 by Bettina Queen RN  Outcome: Progressing  1/25/2024 2153 by Navid Antunez RN  Outcome: Progressing  Flowsheets  Taken 1/25/2024 2153 by Navid Antunez RN  Discharge to home or other facility with appropriate resources: Identify discharge learning needs (meds, wound care, etc)  Taken 1/25/2024 1015 by Opal Schaeffer RN  Discharge to home or other facility with appropriate resources: Identify barriers to discharge with patient and caregiver     Problem: Skin/Tissue Integrity  Goal: Absence of new skin breakdown  Description: 1.  Monitor for areas of redness and/or skin breakdown  2.  Assess vascular access sites hourly  3.  Every 4-6 hours minimum:  Change oxygen saturation probe site  4.  Every 4-6 hours:  If on nasal continuous positive airway pressure, respiratory therapy assess nares and determine need for appliance change or resting period.  1/26/2024 1140 by Bettina Queen RN  Outcome: Progressing  1/25/2024 2153 by Navid Antunez RN  Outcome: Progressing     Problem: Safety - Adult  Goal: Free from fall injury  Outcome: Progressing     Problem: Pain  Goal: Verbalizes/displays adequate comfort level or baseline comfort level  1/26/2024 1140 by Bettina Queen RN  Outcome: Progressing  1/25/2024 2153 by Nvaid Antunez RN  Outcome: Progressing  Flowsheets  Taken 1/25/2024 2153 by Navid Antunez RN  Verbalizes/displays adequate comfort level or baseline comfort level: Encourage patient to monitor pain and request assistance  Taken 1/25/2024 1015 by Opal Schaeffer RN  Verbalizes/displays adequate comfort level or baseline comfort level: Encourage patient to monitor pain and request assistance     Problem: ABCDS Injury Assessment  Goal: Absence of physical injury  1/26/2024 1140 by Bettina Queen RN  Outcome: Progressing  1/25/2024 2153 by Navid Antuenz RN  Outcome:

## 2024-01-26 NOTE — CARE COORDINATION
01/26/24 1646   IMM Letter   IMM Letter given to Patient/Family/Significant other/Guardian/POA/by: Given to the pt's Niece Madalyn.   IMM Letter date given: 01/26/24   IMM Letter time given: 1500

## 2024-01-26 NOTE — DISCHARGE INSTRUCTIONS
Please call and make an appointment with your PCP within 1 week, if you do not have  PCP you can follow up with [facility] outpatient resident clinic  Please take all your medications as prescribed including any new ones on discharge

## 2024-01-29 NOTE — DISCHARGE SUMMARY
taking these medications      acetaminophen 500 MG tablet  Commonly known as: TYLENOL     loperamide 2 MG capsule  Commonly known as: IMODIUM     nitrofurantoin 100 MG capsule  Commonly known as: MACRODANTIN     potassium chloride 20 MEQ packet  Commonly known as: KLOR-CON               Where to Get Your Medications        These medications were sent to [facility] [place], [place] - [address] - P [phone] - F [phone]  [address], [facility] [address]      Phone: [phone]   baricitinib 2 MG Tabs tablet  folic acid 1 MG tablet  miconazole 2 % powder        Time Spent Discharging patient 35 minutes    Electronically signed by Stanton Tariq MD on 1/29/2024 at 3:30 PM

## 2025-02-15 ENCOUNTER — HOSPITAL ENCOUNTER (EMERGENCY)
Age: 85
Discharge: HOME | End: 2025-02-15
Payer: MEDICARE

## 2025-02-15 ENCOUNTER — APPOINTMENT (OUTPATIENT)
Dept: GENERAL RADIOLOGY | Age: 85
End: 2025-02-15
Payer: MEDICARE

## 2025-02-15 VITALS
SYSTOLIC BLOOD PRESSURE: 128 MMHG | DIASTOLIC BLOOD PRESSURE: 76 MMHG | BODY MASS INDEX: 28.82 KG/M2 | OXYGEN SATURATION: 98 % | HEART RATE: 78 BPM | TEMPERATURE: 97.7 F | WEIGHT: 162.7 LBS | RESPIRATION RATE: 18 BRPM

## 2025-02-15 DIAGNOSIS — J10.1 INFLUENZA A: Primary | ICD-10-CM

## 2025-02-15 DIAGNOSIS — R79.89 ELEVATED BRAIN NATRIURETIC PEPTIDE (BNP) LEVEL: ICD-10-CM

## 2025-02-15 LAB
ANION GAP SERPL CALCULATED.3IONS-SCNC: 12 MMOL/L (ref 3–16)
BASOPHILS # BLD: 0 K/UL (ref 0–0.2)
BASOPHILS NFR BLD: 0.8 %
BUN SERPL-MCNC: 50 MG/DL (ref 7–20)
CALCIUM SERPL-MCNC: 9.7 MG/DL (ref 8.3–10.6)
CHLORIDE SERPL-SCNC: 107 MMOL/L (ref 99–110)
CO2 SERPL-SCNC: 19 MMOL/L (ref 21–32)
CREAT SERPL-MCNC: 1.3 MG/DL (ref 0.6–1.2)
DEPRECATED RDW RBC AUTO: 14.5 % (ref 12.4–15.4)
EOSINOPHIL # BLD: 0.1 K/UL (ref 0–0.6)
EOSINOPHIL NFR BLD: 1.1 %
FLUAV + FLUBV AG NOSE IA.RAPID: DETECTED
FLUAV + FLUBV AG NOSE IA.RAPID: NOT DETECTED
GFR SERPLBLD CREATININE-BSD FMLA CKD-EPI: 41 ML/MIN/{1.73_M2}
GLUCOSE SERPL-MCNC: 99 MG/DL (ref 70–99)
HCT VFR BLD AUTO: 37.9 % (ref 36–48)
HGB BLD-MCNC: 12.6 G/DL (ref 12–16)
LYMPHOCYTES # BLD: 1 K/UL (ref 1–5.1)
LYMPHOCYTES NFR BLD: 20.9 %
MCH RBC QN AUTO: 30.9 PG (ref 26–34)
MCHC RBC AUTO-ENTMCNC: 33.2 G/DL (ref 31–36)
MCV RBC AUTO: 93.2 FL (ref 80–100)
MONOCYTES # BLD: 0.5 K/UL (ref 0–1.3)
MONOCYTES NFR BLD: 10.3 %
NEUTROPHILS # BLD: 3.3 K/UL (ref 1.7–7.7)
NEUTROPHILS NFR BLD: 66.9 %
NT-PROBNP SERPL-MCNC: 3871 PG/ML (ref 0–449)
PLATELET # BLD AUTO: 160 K/UL (ref 135–450)
PMV BLD AUTO: 8 FL (ref 5–10.5)
POTASSIUM SERPL-SCNC: 4.8 MMOL/L (ref 3.5–5.1)
RBC # BLD AUTO: 4.07 M/UL (ref 4–5.2)
SARS-COV-2 RDRP RESP QL NAA+PROBE: NOT DETECTED
SODIUM SERPL-SCNC: 138 MMOL/L (ref 136–145)
WBC # BLD AUTO: 4.9 K/UL (ref 4–11)

## 2025-02-15 PROCEDURE — 80048 BASIC METABOLIC PNL TOTAL CA: CPT

## 2025-02-15 PROCEDURE — 6370000000 HC RX 637 (ALT 250 FOR IP): Performed by: NURSE PRACTITIONER

## 2025-02-15 PROCEDURE — 94760 N-INVAS EAR/PLS OXIMETRY 1: CPT

## 2025-02-15 PROCEDURE — 94640 AIRWAY INHALATION TREATMENT: CPT

## 2025-02-15 PROCEDURE — 99284 EMERGENCY DEPT VISIT MOD MDM: CPT

## 2025-02-15 PROCEDURE — 87635 SARS-COV-2 COVID-19 AMP PRB: CPT

## 2025-02-15 PROCEDURE — 87502 INFLUENZA DNA AMP PROBE: CPT

## 2025-02-15 PROCEDURE — 83880 ASSAY OF NATRIURETIC PEPTIDE: CPT

## 2025-02-15 PROCEDURE — 71045 X-RAY EXAM CHEST 1 VIEW: CPT

## 2025-02-15 PROCEDURE — 85025 COMPLETE CBC W/AUTO DIFF WBC: CPT

## 2025-02-15 RX ORDER — PREDNISONE 20 MG/1
40 TABLET ORAL DAILY
Qty: 10 TABLET | Refills: 0 | Status: SHIPPED | OUTPATIENT
Start: 2025-02-15 | End: 2025-02-20

## 2025-02-15 RX ORDER — BENZONATATE 100 MG/1
100 CAPSULE ORAL NIGHTLY PRN
Qty: 10 CAPSULE | Refills: 0 | Status: SHIPPED | OUTPATIENT
Start: 2025-02-15 | End: 2025-02-25

## 2025-02-15 RX ORDER — BENZONATATE 100 MG/1
100 CAPSULE ORAL ONCE
Status: COMPLETED | OUTPATIENT
Start: 2025-02-15 | End: 2025-02-15

## 2025-02-15 RX ORDER — ONDANSETRON 4 MG/1
4 TABLET, ORALLY DISINTEGRATING ORAL 3 TIMES DAILY PRN
Qty: 21 TABLET | Refills: 0 | Status: SHIPPED | OUTPATIENT
Start: 2025-02-15

## 2025-02-15 RX ORDER — ONDANSETRON 4 MG/1
4 TABLET, ORALLY DISINTEGRATING ORAL ONCE
Status: COMPLETED | OUTPATIENT
Start: 2025-02-15 | End: 2025-02-15

## 2025-02-15 RX ORDER — IPRATROPIUM BROMIDE AND ALBUTEROL SULFATE 2.5; .5 MG/3ML; MG/3ML
2 SOLUTION RESPIRATORY (INHALATION) ONCE
Status: COMPLETED | OUTPATIENT
Start: 2025-02-15 | End: 2025-02-15

## 2025-02-15 RX ADMIN — BENZONATATE 100 MG: 100 CAPSULE ORAL at 12:38

## 2025-02-15 RX ADMIN — ONDANSETRON 4 MG: 4 TABLET, ORALLY DISINTEGRATING ORAL at 14:33

## 2025-02-15 RX ADMIN — IPRATROPIUM BROMIDE AND ALBUTEROL SULFATE 2 DOSE: 2.5; .5 SOLUTION RESPIRATORY (INHALATION) at 12:16

## 2025-02-15 ASSESSMENT — PAIN - FUNCTIONAL ASSESSMENT: PAIN_FUNCTIONAL_ASSESSMENT: NONE - DENIES PAIN

## 2025-02-15 NOTE — ED PROVIDER NOTES
[facility] EMERGENCY DEPARTMENT  EMERGENCY DEPARTMENT ENCOUNTER        Pt Name: Jazmín Vazquez  MRN: [id number]  Birthdate 1940  Date of evaluation: 2/15/2025  Provider: VANESSA Dodd CNP  PCP: No primary care provider on file.  Note Started: 3:43 PM EST 2/15/25      {Shared Not Shared:71843}      CHIEF COMPLAINT       Chief Complaint   Patient presents with    Cough     C/o cough and congestion, not improving       HISTORY OF PRESENT ILLNESS: 1 or more Elements     {History from (Optional):87285}    {Limitations to history (Optional):94270}    Jazmín Vazquez is a 84 y.o. female who presents ***    Nursing Notes were all reviewed and agreed with or any disagreements were addressed in the HPI.    REVIEW OF SYSTEMS :      Review of Systems    Positives and Pertinent negatives as per HPI.     SURGICAL HISTORY   No past surgical history on file.    CURRENTMEDICATIONS       Discharge Medication List as of 2/15/2025  3:13 PM        CONTINUE these medications which have NOT CHANGED    Details   miconazole (MICOTIN) 2 % powder Apply topically 2 times daily., Disp-45 g, R-1, Normal      folic acid (FOLVITE) 1 MG tablet Take 1 tablet by mouth daily, Disp-30 tablet, R-3Normal      albuterol (ACCUNEB) 0.63 MG/3ML nebulizer solution Take 3 mLs by nebulization every 6 hours as needed for WheezingHistorical Med      aspirin 81 MG chewable tablet Take 1 tablet by mouth dailyHistorical Med      atorvastatin (LIPITOR) 40 MG tablet Take 1 tablet by mouth dailyHistorical Med      PROBIOTIC PRODUCT PO Take 1 tablet by mouth 2 times dailyHistorical Med      carvedilol (COREG) 12.5 MG tablet Take 1 tablet by mouth 2 times daily (with meals)Historical Med      citalopram (CELEXA) 40 MG tablet Take 1 tablet by mouth dailyHistorical Med      clopidogrel (PLAVIX) 75 MG tablet Take 1 tablet by mouth dailyHistorical Med      guaiFENesin (MUCINEX) 600 MG extended release tablet Take 2 tablets by mouth 2 times

## 2025-02-15 NOTE — DISCHARGE INSTRUCTIONS
Return to the Emergency Department with worsening symptoms including, limited to, developing chest pain, worsening shortness of breath, fevers not relieved by medications, inability to tolerate food or drink, or other symptoms/concerns      Medications as prescribed.      Follow-up with your primary care provider for reevaluation next 3-4 days for reevaluation.  If you do not have a primary care provider please follow-up with the [facility] residency clinic by calling provided number  To establish a PCP for your future nonemergent medical needs.          [facility] internal medicine residency practice  [address], [place] [address] [phone]         [facility]  [address] [place] [address] [phone]      [facility]-Services  [phone]

## 2025-02-17 ASSESSMENT — ENCOUNTER SYMPTOMS
SHORTNESS OF BREATH: 1
BACK PAIN: 0
SORE THROAT: 0
COUGH: 1
TROUBLE SWALLOWING: 0
EYE PAIN: 0
ABDOMINAL PAIN: 0
VOICE CHANGE: 0
DIARRHEA: 0
ANAL BLEEDING: 0
VOMITING: 0
NAUSEA: 1